# Patient Record
Sex: FEMALE | Race: WHITE | NOT HISPANIC OR LATINO | Employment: FULL TIME | URBAN - METROPOLITAN AREA
[De-identification: names, ages, dates, MRNs, and addresses within clinical notes are randomized per-mention and may not be internally consistent; named-entity substitution may affect disease eponyms.]

---

## 2022-02-27 ENCOUNTER — OFFICE VISIT (OUTPATIENT)
Dept: URGENT CARE | Facility: CLINIC | Age: 37
End: 2022-02-27
Payer: COMMERCIAL

## 2022-02-27 VITALS
HEART RATE: 102 BPM | WEIGHT: 145 LBS | OXYGEN SATURATION: 97 % | HEIGHT: 64 IN | TEMPERATURE: 100.4 F | DIASTOLIC BLOOD PRESSURE: 82 MMHG | SYSTOLIC BLOOD PRESSURE: 136 MMHG | RESPIRATION RATE: 18 BRPM | BODY MASS INDEX: 24.75 KG/M2

## 2022-02-27 DIAGNOSIS — J02.9 SORE THROAT: Primary | ICD-10-CM

## 2022-02-27 LAB — S PYO AG THROAT QL: NEGATIVE

## 2022-02-27 PROCEDURE — 99213 OFFICE O/P EST LOW 20 MIN: CPT

## 2022-02-27 PROCEDURE — 87880 STREP A ASSAY W/OPTIC: CPT

## 2022-02-27 PROCEDURE — 87070 CULTURE OTHR SPECIMN AEROBIC: CPT

## 2022-02-27 RX ORDER — LORATADINE 10 MG/1
10 TABLET ORAL DAILY
COMMUNITY

## 2022-02-27 RX ORDER — METHYLPREDNISOLONE 4 MG/1
TABLET ORAL
Qty: 21 EACH | Refills: 0 | Status: SHIPPED | OUTPATIENT
Start: 2022-02-27

## 2022-02-27 RX ORDER — MULTIVIT-MIN/IRON FUM/FOLIC AC 7.5 MG-4
1 TABLET ORAL DAILY
COMMUNITY

## 2022-02-27 NOTE — PATIENT INSTRUCTIONS
Strep Throat   Rapid strep negative in house  Will send for culture  OTC medications discussed  Steroid as directed  Follow up with PCP in 3-5 days  Proceed to ER if symptoms worsen  AMBULATORY CARE:   Strep throat  is a throat infection caused by bacteria  It is easily spread from person to person  Common symptoms include the following:   · Sore, red, and swollen throat    · Fever and headache     · Upset stomach, abdominal pain, or vomiting    · White or yellow patches or blisters in the back of your throat    · Tender, swollen lumps on the sides of your neck or jaw    · Throat pain when you swallow    Call 911 for any of the following:   · You have trouble breathing  Seek care immediately if:   · You have new symptoms like a bad headache, stiff neck, chest pain, or vomiting  · You are drooling because you cannot swallow your spit  Contact your healthcare provider if:   · You have a fever  · You have a rash or ear pain  · You have green, yellow-brown, or bloody mucus when you cough or blow your nose  · You are unable to drink anything  · You have questions or concerns about your condition or care  Treatment for strep throat  may include antibiotic medicine to treat your strep throat  You should feel better within 2 to 3 days after you start antibiotics  You may return to work or school 24 hours after you start antibiotics  Manage strep throat:   · Use lozenges, ice, soft foods, or popsicles  to soothe your throat  · Drink juice, milk shakes, or soup  if your throat is too sore to eat solid food  Drinking liquids can also help prevent dehydration  · Gargle with salt water  Mix ¼ teaspoon salt in a glass of warm water and gargle  This may help reduce swelling in your throat  · Do not smoke  Nicotine and other chemicals in cigarettes and cigars can cause lung damage and make your symptoms worse   Ask your healthcare provider for information if you currently smoke and need help to quit  E-cigarettes or smokeless tobacco still contain nicotine  Talk to your healthcare provider before you use these products  Prevent the spread of strep throat:   · Wash your hands often  Use soap and water  Wash your hands after you use the bathroom, change a child's diapers, or sneeze  Wash your hands before you prepare or eat food  · Do not share food or drinks  Replace your toothbrush after you have taken antibiotics for 24 hours  Follow up with your doctor as directed:  Write down your questions so you remember to ask them during your visits  © Copyright [employer] 2022 Information is for End User's use only and may not be sold, redistributed or otherwise used for commercial purposes  All illustrations and images included in CareNotes® are the copyrighted property of [employer]  or Jackie Frank  The above information is an  only  It is not intended as medical advice for individual conditions or treatments  Talk to your doctor, nurse or pharmacist before following any medical regimen to see if it is safe and effective for you

## 2022-02-27 NOTE — PROGRESS NOTES
[facility]        NAME: Rafaela De Leon is a 31 y o  female  : 1985    MRN: [id number]  DATE: 2022  TIME: 3:21 PM    Assessment and Plan   Sore throat [J02 9]  1  Sore throat  POCT rapid strepA    methylPREDNISolone 4 MG tablet therapy pack    Throat culture         Patient Instructions     Rapid strep negative  Throat culture sent  Consider mono if throat culture is negative  Steroid for inflammation and swelling  Follow up with PCP in 3-5 days  Proceed to ER if symptoms worsen  Chief Complaint     Chief Complaint   Patient presents with    Sore Throat     painful sore throat, redness; started wednesday         History of Present Illness     31 y o  F presents with complaint of sore throat and trouble swallowing x 5 days  Denies CP, SOB, N/V/D, fevers, chills, cough, congestion, rhinorrhea  Not taking anything OTC  Denies sick contacts  No tobacco use  No hx asthma  Not vaccinated  Review of Systems   Review of Systems   Constitutional: Negative for chills, fatigue and fever  HENT: Positive for sore throat and trouble swallowing  Negative for congestion, ear discharge, ear pain, facial swelling, rhinorrhea and sinus pain  Eyes: Negative for photophobia and visual disturbance  Respiratory: Negative for cough, chest tightness, shortness of breath and wheezing  Cardiovascular: Negative for chest pain, palpitations and leg swelling  Gastrointestinal: Negative for abdominal pain, diarrhea, nausea and vomiting  Genitourinary: Negative for dysuria and flank pain  Musculoskeletal: Negative for arthralgias, back pain and myalgias  Skin: Negative for pallor  Neurological: Negative for dizziness and headaches  Psychiatric/Behavioral: Negative for sleep disturbance           Current Medications       Current Outpatient Medications:     loratadine (CLARITIN) 10 mg tablet, Take 10 mg by mouth daily, Disp: , Rfl:     Multiple Vitamins-Minerals (multivitamin with minerals) tablet, Take 1 tablet by mouth daily, Disp: , Rfl:     methylPREDNISolone 4 MG tablet therapy pack, Use as directed on package, Disp: 21 each, Rfl: 0    Current Allergies     Allergies as of 2022    (No Known Allergies)            The following portions of the patient's history were reviewed and updated as appropriate: allergies, current medications, past family history, past medical history, past social history, past surgical history and problem list      Past Medical History:   Diagnosis Date    Allergic        Past Surgical History:   Procedure Laterality Date    BREAST SURGERY       SECTION      LIVER SURGERY         History reviewed  No pertinent family history  Medications have been verified  Objective   /82   Pulse 102   Temp 100 4 °F (38 °C)   Resp 18   Ht 5' 4" (1 626 m)   Wt 65 8 kg (145 lb)   SpO2 97%   BMI 24 89 kg/m²   No LMP recorded  Physical Exam     Physical Exam  Vitals reviewed  Constitutional:       General: She is not in acute distress  Appearance: Normal appearance  She is not toxic-appearing  HENT:      Head: Normocephalic  Right Ear: Tympanic membrane normal       Left Ear: Tympanic membrane normal       Nose: No congestion or rhinorrhea  Right Sinus: No maxillary sinus tenderness or frontal sinus tenderness  Left Sinus: No maxillary sinus tenderness or frontal sinus tenderness  Mouth/Throat:      Pharynx: Uvula midline  Posterior oropharyngeal erythema present  No oropharyngeal exudate or uvula swelling  Tonsils: Tonsillar exudate present  No tonsillar abscesses  2+ on the right  2+ on the left  Eyes:      Pupils: Pupils are equal, round, and reactive to light  Cardiovascular:      Rate and Rhythm: Normal rate and regular rhythm  Pulses: Normal pulses  Heart sounds: Normal heart sounds  Pulmonary:      Effort: Pulmonary effort is normal       Breath sounds: Normal breath sounds  No wheezing  Abdominal:      General: Bowel sounds are normal       Palpations: Abdomen is soft  Musculoskeletal:         General: Normal range of motion  Cervical back: Normal range of motion  Lymphadenopathy:      Cervical: No cervical adenopathy  Skin:     General: Skin is warm and dry  Neurological:      General: No focal deficit present  Mental Status: She is alert

## 2022-03-01 LAB — BACTERIA THROAT CULT: NORMAL

## 2023-01-25 ENCOUNTER — OFFICE VISIT (OUTPATIENT)
Dept: URGENT CARE | Facility: CLINIC | Age: 38
End: 2023-01-25

## 2023-01-25 VITALS
BODY MASS INDEX: 25.61 KG/M2 | HEART RATE: 103 BPM | DIASTOLIC BLOOD PRESSURE: 93 MMHG | RESPIRATION RATE: 18 BRPM | WEIGHT: 150 LBS | SYSTOLIC BLOOD PRESSURE: 132 MMHG | OXYGEN SATURATION: 99 % | TEMPERATURE: 98 F | HEIGHT: 64 IN

## 2023-01-25 DIAGNOSIS — J02.9 SORE THROAT: Primary | ICD-10-CM

## 2023-01-25 DIAGNOSIS — J02.0 STREP PHARYNGITIS: ICD-10-CM

## 2023-01-25 LAB — S PYO AG THROAT QL: NEGATIVE

## 2023-01-25 RX ORDER — AMOXICILLIN 500 MG/1
500 CAPSULE ORAL EVERY 12 HOURS SCHEDULED
Qty: 20 CAPSULE | Refills: 0 | Status: SHIPPED | OUTPATIENT
Start: 2023-01-25 | End: 2023-02-04

## 2023-01-25 NOTE — LETTER
January 25, 2023     Patient: Naveen Clemons   YOB: 1985   Date of Visit: 1/25/2023       To Whom it May Concern:    Carlos Cano was seen in my clinic on 1/25/2023  Please excuse from work on 1/26/2023 and 1/27/2023  If you have any questions or concerns, please don't hesitate to call           Sincerely,          TK Humphrey        CC: No Recipients

## 2023-01-26 NOTE — PATIENT INSTRUCTIONS
Take the antibiotics with food  Finish the entire course of antibiotics  Use a warm mist humidifier or vaporizer  Hot tea with honey  Warm saline gargle or throat lozenge may help with a sore throat  OTC saline nasal sprays   Drink plenty of fluids  Length To Time In Minutes Device Was In Place: 10

## 2023-01-26 NOTE — PROGRESS NOTES
[facility]        NAME: Rica Ding is a 30 y o  female  : 1985    MRN: [id number]  DATE: 2023  TIME: 7:50 PM    Assessment and Plan   Sore throat [J02 9]  1  Sore throat  POCT rapid strepA    Throat culture      2  Strep pharyngitis  amoxicillin (AMOXIL) 500 mg capsule            Patient Instructions       Follow up with PCP in 3-5 days  Proceed to  ER if symptoms worsen  Chief Complaint     Chief Complaint   Patient presents with   • Sore Throat     Pt reports swollen lymph nodes and sore throat for the past 2 days  History of Present Illness       Sore Throat   This is a new problem  The current episode started in the past 7 days  The problem has been gradually worsening  There has been no fever  The pain is mild  Associated symptoms include headaches, a hoarse voice and swollen glands  Pertinent negatives include no abdominal pain, congestion, coughing, diarrhea, shortness of breath, trouble swallowing or vomiting  She has had exposure to strep  Review of Systems   Review of Systems   Constitutional: Positive for fatigue  Negative for activity change, appetite change, chills and fever  HENT: Positive for hoarse voice and sore throat  Negative for congestion and trouble swallowing  Respiratory: Negative for cough and shortness of breath  Gastrointestinal: Negative for abdominal pain, diarrhea and vomiting  Musculoskeletal: Negative for arthralgias  Neurological: Positive for headaches  All other systems reviewed and are negative          Current Medications       Current Outpatient Medications:   •  amoxicillin (AMOXIL) 500 mg capsule, Take 1 capsule (500 mg total) by mouth every 12 (twelve) hours for 10 days, Disp: 20 capsule, Rfl: 0  •  loratadine (CLARITIN) 10 mg tablet, Take 10 mg by mouth daily, Disp: , Rfl:   •  Multiple Vitamins-Minerals (multivitamin with minerals) tablet, Take 1 tablet by mouth daily, Disp: , Rfl:   • methylPREDNISolone 4 MG tablet therapy pack, Use as directed on package (Patient not taking: Reported on 2023), Disp: 21 each, Rfl: 0    Current Allergies     Allergies as of 2023   • (No Known Allergies)            The following portions of the patient's history were reviewed and updated as appropriate: allergies, current medications, past family history, past medical history, past social history, past surgical history and problem list      Past Medical History:   Diagnosis Date   • Allergic    • Liver cyst        Past Surgical History:   Procedure Laterality Date   • BREAST SURGERY     •  SECTION         Family History   Problem Relation Age of Onset   • Cancer Mother    • Ulcerative colitis Mother    • No Known Problems Father          Medications have been verified  Objective   /93   Pulse 103   Temp 98 °F (36 7 °C)   Resp 18   Ht 5' 4" (1 626 m)   Wt 68 kg (150 lb)   LMP 2023   SpO2 99%   BMI 25 75 kg/m²        Physical Exam     Physical Exam  Vitals and nursing note reviewed  Constitutional:       General: She is not in acute distress  Appearance: She is well-developed and normal weight  She is not ill-appearing or toxic-appearing  HENT:      Right Ear: Tympanic membrane normal       Nose: No congestion  Mouth/Throat:      Pharynx: Oropharyngeal exudate and posterior oropharyngeal erythema present  Cardiovascular:      Rate and Rhythm: Normal rate and regular rhythm  Heart sounds: Normal heart sounds  Pulmonary:      Effort: Pulmonary effort is normal       Breath sounds: Normal breath sounds  Lymphadenopathy:      Cervical: Cervical adenopathy present  Skin:     General: Skin is warm  Capillary Refill: Capillary refill takes less than 2 seconds  Neurological:      General: No focal deficit present  Mental Status: She is alert and oriented to person, place, and time

## 2023-01-27 LAB — BACTERIA THROAT CULT: NORMAL

## 2023-04-29 ENCOUNTER — OFFICE VISIT (OUTPATIENT)
Dept: URGENT CARE | Facility: CLINIC | Age: 38
End: 2023-04-29

## 2023-04-29 VITALS
BODY MASS INDEX: 26.46 KG/M2 | OXYGEN SATURATION: 99 % | HEIGHT: 64 IN | DIASTOLIC BLOOD PRESSURE: 78 MMHG | WEIGHT: 155 LBS | RESPIRATION RATE: 16 BRPM | SYSTOLIC BLOOD PRESSURE: 120 MMHG | HEART RATE: 87 BPM | TEMPERATURE: 99.2 F

## 2023-04-29 DIAGNOSIS — H92.02 LEFT EAR PAIN: Primary | ICD-10-CM

## 2023-04-29 RX ORDER — CETIRIZINE HYDROCHLORIDE 10 MG/1
10 TABLET ORAL DAILY
COMMUNITY

## 2023-04-29 NOTE — PROGRESS NOTES
[facility]        NAME: Rosa Isela Mcarthur is a 30 y o  female  : 1985    MRN: [id number]  DATE: 2023  TIME: 4:11 PM    Assessment and Plan   Left ear pain [H92 02]  1  Left ear pain              Patient Instructions     Take tylenol/ibuprofen as needed for pain   Ice as needed   Flonase as needed  Follow up with PCP in 3-5 days  Proceed to  ER if symptoms worsen  Chief Complaint     Chief Complaint   Patient presents with   • Earache     C/o pain over  right ear, Points to upper auricle area  Onset of pain 4 days ago, but seemed worse this AM          History of Present Illness       Earache   There is pain in the right (top of auricle and behind right ear  Is a pressure and tinglng feeling) ear  This is a new problem  Episode onset: 4 days ago but worse this morning  There has been no fever  Pertinent negatives include no coughing, ear discharge, hearing loss, rhinorrhea or sore throat  Associated symptoms comments: 1 week ago had runny nose and congestion but has since resolved  She has tried nothing for the symptoms  Review of Systems   Review of Systems   Constitutional: Negative for chills, diaphoresis, fatigue and fever  HENT: Positive for ear pain (top of right auricle and behind right ear)  Negative for congestion, ear discharge, hearing loss, postnasal drip, rhinorrhea, sinus pressure, sore throat and trouble swallowing  Respiratory: Negative for cough, chest tightness, shortness of breath and wheezing  Cardiovascular: Negative for chest pain and palpitations  Skin: Negative for color change  Neurological: Negative for dizziness and light-headedness  Psychiatric/Behavioral: Negative for sleep disturbance           Current Medications       Current Outpatient Medications:   •  cetirizine (ZyrTEC) 10 mg tablet, Take 10 mg by mouth daily, Disp: , Rfl:   •  Multiple Vitamins-Minerals (multivitamin with minerals) tablet, Take 1 tablet by mouth daily, Disp: , "Rfl:   •  loratadine (CLARITIN) 10 mg tablet, Take 10 mg by mouth daily (Patient not taking: Reported on 2023), Disp: , Rfl:   •  methylPREDNISolone 4 MG tablet therapy pack, Use as directed on package (Patient not taking: Reported on 2023), Disp: 21 each, Rfl: 0    Current Allergies     Allergies as of 2023   • (No Known Allergies)            The following portions of the patient's history were reviewed and updated as appropriate: allergies, current medications, past family history, past medical history, past social history, past surgical history and problem list      Past Medical History:   Diagnosis Date   • Allergic    • Liver cyst        Past Surgical History:   Procedure Laterality Date   • BREAST SURGERY     •  SECTION         Family History   Problem Relation Age of Onset   • Cancer Mother    • Ulcerative colitis Mother    • No Known Problems Father          Medications have been verified  Objective   /78   Pulse 87   Temp 99 2 °F (37 3 °C)   Resp 16   Ht 5' 4\" (1 626 m)   Wt 70 3 kg (155 lb)   LMP 2023 Comment: Tubes removed  SpO2 99%   BMI 26 61 kg/m²        Physical Exam     Physical Exam  Constitutional:       General: She is not in acute distress  Appearance: Normal appearance  She is not ill-appearing  HENT:      Head: Normocephalic  Right Ear: Tympanic membrane and external ear normal       Left Ear: Tympanic membrane and external ear normal       Ears:      Comments: No pain to palpation at auricle, tragus, or mastoid  No redness, rash or swelling around ear  No drainage  Nose: No congestion  Mouth/Throat:      Mouth: Mucous membranes are moist       Pharynx: Oropharynx is clear  Eyes:      Extraocular Movements: Extraocular movements intact  Pupils: Pupils are equal, round, and reactive to light  Cardiovascular:      Rate and Rhythm: Normal rate and regular rhythm  Pulses: Normal pulses        Heart sounds: Normal " heart sounds  Pulmonary:      Effort: Pulmonary effort is normal  No respiratory distress  Breath sounds: Normal breath sounds  No stridor  No wheezing, rhonchi or rales  Lymphadenopathy:      Cervical: No cervical adenopathy  Skin:     General: Skin is warm and dry  Neurological:      General: No focal deficit present  Mental Status: She is alert and oriented to person, place, and time  Mental status is at baseline  Psychiatric:         Mood and Affect: Mood normal          Behavior: Behavior normal          Thought Content:  Thought content normal          Judgment: Judgment normal

## 2023-04-29 NOTE — PATIENT INSTRUCTIONS
Take tylenol/ibuprofen as needed for pain   Ice as needed   Flonase as needed  Follow up with PCP in 3-5 days  Proceed to  ER if symptoms worsen

## 2023-05-02 NOTE — PROGRESS NOTES
CLARIFY DIAGNOSIS RESPONSE NOTE    Based on the query that was sent to you, please clarify the diagnosis below  The final diagnosis is right ear pain, not left ear pain

## 2024-01-04 ENCOUNTER — OFFICE VISIT (OUTPATIENT)
Dept: URGENT CARE | Facility: CLINIC | Age: 39
End: 2024-01-04
Payer: COMMERCIAL

## 2024-01-04 ENCOUNTER — APPOINTMENT (OUTPATIENT)
Dept: RADIOLOGY | Facility: CLINIC | Age: 39
End: 2024-01-04
Payer: COMMERCIAL

## 2024-01-04 VITALS
DIASTOLIC BLOOD PRESSURE: 70 MMHG | HEART RATE: 82 BPM | SYSTOLIC BLOOD PRESSURE: 126 MMHG | HEIGHT: 64 IN | OXYGEN SATURATION: 99 % | WEIGHT: 150 LBS | TEMPERATURE: 97 F | RESPIRATION RATE: 17 BRPM | BODY MASS INDEX: 25.61 KG/M2

## 2024-01-04 DIAGNOSIS — S99.921A INJURY OF RIGHT FOOT, INITIAL ENCOUNTER: Primary | ICD-10-CM

## 2024-01-04 DIAGNOSIS — S09.90XA INJURY OF HEAD, INITIAL ENCOUNTER: ICD-10-CM

## 2024-01-04 PROCEDURE — 73630 X-RAY EXAM OF FOOT: CPT

## 2024-01-04 PROCEDURE — 99213 OFFICE O/P EST LOW 20 MIN: CPT

## 2024-01-04 NOTE — PROGRESS NOTES
[facility]        NAME: Melony Ramires is a 38 y.o. female  : 1985    MRN: [id number]  DATE: 2024  TIME: 6:13 PM    Assessment and Plan   Injury of right foot, initial encounter [S99.921A]  1. Injury of right foot, initial encounter  XR foot 3+ vw right    Ambulatory Referral to Orthopedic Surgery        Preliminary xray read by myself. Suspicious density at 5th metatarsal where she is having pain. Pending radiologist final read.      Boot applied and ortho referral given.     Patient Instructions     Tylenol or ibuprofen for inflammation and pain  Wear boot for support (Remove every 3 hours)  Ice 20 minutes 3-4 times per day  Insulate the skin from the ice to prevent frostbite  Rest and Elevate  Follow up with orthopedics    Remove splint/brace and go straight to ER if you experience sudden increase in pain, swelling, change in color/temperature/sensation, chest pain, shortness or breath, or if you start coughing up blood.   Follow up with PCP in 3-5 days.  Proceed to  ER if symptoms worsen.    Chief Complaint     Chief Complaint   Patient presents with    Foot Pain     C/o pain in right foot after using treadmill 5 days ago. Pain with weight bearing. Points to lateral right foot area radiating into plantar surface         History of Present Illness       Patient presenting with right foot pain x 5 days.  She has pain with weightbearing.  Her pain is at the lateral right foot right foot and radiates to the plantar surface. She denies any numbness or tingling of her foot. She has been icing it without relief.       Review of Systems   Review of Systems   Constitutional:  Negative for activity change, chills, diaphoresis, fatigue and fever.   Respiratory:  Negative for chest tightness, shortness of breath and wheezing.    Cardiovascular:  Negative for chest pain, palpitations and leg swelling.   Genitourinary: Negative.    Musculoskeletal:  Positive for arthralgias (R foot pain) and  "gait problem (due to pain). Negative for back pain, joint swelling, neck pain and neck stiffness.   Skin: Negative.    Neurological:  Negative for dizziness, tremors, syncope, weakness, light-headedness and numbness.         Current Medications       Current Outpatient Medications:     cetirizine (ZyrTEC) 10 mg tablet, Take 10 mg by mouth daily, Disp: , Rfl:     Multiple Vitamins-Minerals (multivitamin with minerals) tablet, Take 1 tablet by mouth daily, Disp: , Rfl:     loratadine (CLARITIN) 10 mg tablet, Take 10 mg by mouth daily (Patient not taking: Reported on 2023), Disp: , Rfl:     methylPREDNISolone 4 MG tablet therapy pack, Use as directed on package (Patient not taking: Reported on 2023), Disp: 21 each, Rfl: 0    Current Allergies     Allergies as of 2024    (No Known Allergies)            The following portions of the patient's history were reviewed and updated as appropriate: allergies, current medications, past family history, past medical history, past social history, past surgical history and problem list.     Past Medical History:   Diagnosis Date    Allergic     Liver cyst        Past Surgical History:   Procedure Laterality Date    BREAST SURGERY       SECTION      TUBAL LIGATION         Family History   Problem Relation Age of Onset    Cancer Mother     Ulcerative colitis Mother     No Known Problems Father          Medications have been verified.        Objective   /70   Pulse 82   Temp (!) 97 °F (36.1 °C)   Resp 17   Ht 5' 4\" (1.626 m)   Wt 68 kg (150 lb)   LMP 2023   SpO2 99%   BMI 25.75 kg/m²        Physical Exam     Physical Exam  Constitutional:       Appearance: Normal appearance.   HENT:      Head: Normocephalic.   Eyes:      Extraocular Movements: Extraocular movements intact.      Pupils: Pupils are equal, round, and reactive to light.   Cardiovascular:      Rate and Rhythm: Normal rate and regular rhythm.      Pulses: Normal pulses.      Heart " sounds: Normal heart sounds.   Pulmonary:      Effort: Pulmonary effort is normal.      Breath sounds: Normal breath sounds.   Musculoskeletal:         General: No swelling, tenderness, deformity or signs of injury. Normal range of motion.      Cervical back: Normal range of motion. No rigidity or tenderness.   Skin:     General: Skin is warm and dry.      Capillary Refill: Capillary refill takes less than 2 seconds.      Findings: No bruising, erythema or lesion.   Neurological:      General: No focal deficit present.      Mental Status: She is alert and oriented to person, place, and time. Mental status is at baseline.   Psychiatric:         Mood and Affect: Mood normal.         Behavior: Behavior normal.         Thought Content: Thought content normal.         Judgment: Judgment normal.   Orthopedic injury treatment    Date/Time: 1/4/2024 5:30 PM    Performed by: Pancho Kirkpatrick PA-C  Authorized by: Pancho Kirkpatrick PA-C    Patient Location:  Bedside  [place] Protocol:  Consent: Verbal consent obtained.  Risks and benefits: risks, benefits and alternatives were discussed  Consent given by: patient    Injury location:  Foot  Location details:  Right foot  Injury type:  Soft tissue  Neurovascular status: Neurovascularly intact    Distal perfusion: normal    Neurological function: normal    Range of motion: normal    Immobilization:  Other (comment) (walking boot)  Neurovascular status: Neurovascularly intact    Distal perfusion: normal    Neurological function: normal    Range of motion: normal    Patient tolerance:  Patient tolerated the procedure well with no immediate complications

## 2024-01-04 NOTE — PATIENT INSTRUCTIONS
Tylenol or ibuprofen for inflammation and pain  Wear boot for support (Remove every 3 hours)  Ice 20 minutes 3-4 times per day  Insulate the skin from the ice to prevent frostbite  Rest and Elevate  Follow up with orthopedics    Remove splint/brace and go straight to ER if you experience sudden increase in pain, swelling, change in color/temperature/sensation, chest pain, shortness or breath, or if you start coughing up blood.   Follow up with PCP in 3-5 days.  Proceed to  ER if symptoms worsen.

## 2024-01-12 ENCOUNTER — OFFICE VISIT (OUTPATIENT)
Dept: OBGYN CLINIC | Facility: CLINIC | Age: 39
End: 2024-01-12
Payer: COMMERCIAL

## 2024-01-12 VITALS
HEIGHT: 64 IN | HEART RATE: 74 BPM | BODY MASS INDEX: 26.22 KG/M2 | WEIGHT: 153.6 LBS | SYSTOLIC BLOOD PRESSURE: 120 MMHG | TEMPERATURE: 97.8 F | DIASTOLIC BLOOD PRESSURE: 70 MMHG

## 2024-01-12 DIAGNOSIS — M76.71 PERONEAL TENDINITIS, RIGHT: Primary | ICD-10-CM

## 2024-01-12 DIAGNOSIS — M79.671 ACUTE FOOT PAIN, RIGHT: ICD-10-CM

## 2024-01-12 PROCEDURE — 99203 OFFICE O/P NEW LOW 30 MIN: CPT | Performed by: STUDENT IN AN ORGANIZED HEALTH CARE EDUCATION/TRAINING PROGRAM

## 2024-01-12 NOTE — PROGRESS NOTES
"1. Peroneal tendinitis, right        2. Acute foot pain, right          No orders of the defined types were placed in this encounter.       Imaging Studies (I personally reviewed images in PACS and report):    X-ray right foot 1/4/2024: No acute osseous abnormalities.  No significant degenerative changes.  Soft tissues unremarkable.    IMPRESSION:  Acute atraumatic right lateral foot pain-aggravated while initially running in new footwear  Radiographic imaging unremarkable  Progressively improving since being in cam boot  Clinical history exam consistent with peroneal tendinitis    PLAN:    Clinical exam and radiographic imaging reviewed with patient today, with impression as per above. I have discussed with the patient the pathophysiology of this diagnosis and reviewed how the examination correlates with this diagnosis.    Prior imaging reviewed with patient today as noted above.  I reassured her that I did not appreciate nor did radiology note the indication of fracture at this time.  Clinically I do not feel she has a fracture as well.  I counseled her symptoms seem to be more consistent with peroneal tendinitis/myofascial pain possibly from transitioning into new footwear.  Counseled she can work on transitioning from her cam boot to regular footwear as tolerated at this time.   Provided her home exercise/stretches to help prevent recurrence of this pain in the future.  Regards to pain control I counseled as needed use of acetaminophen, NSAIDs, heat therapy 20 minutes on/off.    Return if symptoms worsen or fail to improve.      There are no Patient Instructions on file for this visit.    Portions of the record may have been created with voice recognition software. Occasional wrong word or \"sound a like\" substitutions may have occurred due to the inherent limitations of voice recognition software. Read the chart carefully and recognize, using context, where substitutions have occurred.     CHIEF " COMPLAINT:  Chief Complaint   Patient presents with    Right Foot - Pain         HPI:  Melony Ramires is a 38 y.o. female  who presents for       Visit 2024:  Initial evaluation of right foot pain:  Patient had gone to urgent care for this issue on 2024 and had imaging done as noted above.  She was placed in a cam boot and is here today to follow-up from urgent care.    Patient reports aggravating her right lateral foot approximately 2 weeks ago.  She cannot recall any precipitating injury.  She states she did recently buy new footwear to start running.  She noticed within the first mile of running she was having progressively worsening lateral foot pain that she describes as sharp and aching.  She reports she did not persistently run through the pain and attempted to rest but noticed with prolonged walking he also had an aggravating pain.  She states despite the pain, foot did not swell she denies any bruising/discoloration, N/T.  She reports use of Tylenol and NSAIDs but did not feel it provided much relief which prompted her to go to urgent care.  She states while using the cam boot she did notice significant improvement of her pain in the past few days and that she only has minimal/intermittent discomfort over the lateral foot.  She denies having similar pain in the past.  Denies prior surgeries of her right foot in the past.        Review of Systems      Medical, Surgical, Family, and Social History    Past Medical History:   Diagnosis Date    Allergic     Liver cyst      Past Surgical History:   Procedure Laterality Date    BREAST SURGERY       SECTION      TUBAL LIGATION       Social History   Social History     Substance and Sexual Activity   Alcohol Use Yes    Comment: social     Social History     Substance and Sexual Activity   Drug Use Not Currently     Social History     Tobacco Use   Smoking Status Never   Smokeless Tobacco Never     Family History   Problem Relation Age of Onset  "   Cancer Mother     Ulcerative colitis Mother     No Known Problems Father      No Known Allergies       Physical Exam  /70   Pulse 74   Temp 97.8 °F (36.6 °C) (Temporal)   Ht 5' 4\" (1.626 m)   Wt 69.7 kg (153 lb 9.6 oz)   LMP 12/17/2023   BMI 26.37 kg/m²     Constitutional:  see vital signs  Gen: well-developed, normocephalic/atraumatic, well-groomed  Eyes: No inflammation or discharge of conjunctiva or lids; sclera clear   Pulmonary/Chest: Effort normal. No respiratory distress.     Ortho Exam   Ankle Examination (focused):     Gait: Patient seen weightbearing in a high tide Cam boot of her right foot.  Cam boot was removed for examination.  She is able to demonstrate weightbearing outside of the cam boot as well as toe standing and walking without any reported pain      RIGHT   Inspection Erythema none    Edema none    Ecchymosis none        ROM:  Plantarflexion 50    Dorsiflexion 20        Strength Pronation 5/5    Supination 5/5    Foot plantarflexion 5/5    Foot dorsflexion 5/5        TTP AiTFL no    ATFL no    CFL no    PTFL no    Achilles no    Deltoid no    Peroneal +minimal discomfort    Tib Ant no    Tib Post no        TTP (Bony) Prox Fibula no    Lat Malleolus no    Base of 5th MT No (and no pain with application of tuning fork testing)    Med Malleolus no    Navicular no    Talar Dome no        Anterior Drawer ATFL negative   Calcaneal Squeeze  negative   Tib-Fib Squeeze Test  negative   Talar Tilt (stab tib,DF foot,invert foot) CFL negative   ER Stress (stab tib,ER foot) High ankle negative   Eversion stress (stab tib, soo foot) deltoid negative   MT Compression  negative         No calf tenderness to palpation     LE NV Exam: +2 DP/PT pulses   Sensation intact to light touch          Procedures        "

## 2024-05-23 ENCOUNTER — OFFICE VISIT (OUTPATIENT)
Dept: URGENT CARE | Facility: CLINIC | Age: 39
End: 2024-05-23
Payer: COMMERCIAL

## 2024-05-23 VITALS
TEMPERATURE: 98.2 F | OXYGEN SATURATION: 99 % | SYSTOLIC BLOOD PRESSURE: 112 MMHG | HEART RATE: 77 BPM | WEIGHT: 150 LBS | RESPIRATION RATE: 16 BRPM | HEIGHT: 64 IN | BODY MASS INDEX: 25.61 KG/M2 | DIASTOLIC BLOOD PRESSURE: 76 MMHG

## 2024-05-23 DIAGNOSIS — R10.13 EPIGASTRIC PAIN: Primary | ICD-10-CM

## 2024-05-23 PROCEDURE — 99213 OFFICE O/P EST LOW 20 MIN: CPT

## 2024-05-23 NOTE — PROGRESS NOTES
[facility]        NAME: Melony Ramires is a 38 y.o. female  : 1985    MRN: [id number]  DATE: May 23, 2024  TIME: 5:47 PM    Assessment and Plan   Epigastric pain [R10.13]  1. Epigastric pain          Unclear etiology for patient's symptoms. Encouraged continued supportive measures.  Follow up with PCP in 3-5 days or proceed to emergency department for worsening symptoms.  Low threshold to proceed to ED as may need imaging or laboratory studies to determine etiology. Red flag symptoms reviewed. Patient verbalized understanding of instructions given.       Patient Instructions     Patient Instructions   Increase fluid intake  OTC TUMS or Pepto-Bismol   Monitor symptoms   Follow up with PCP in 3-5 days.  Proceed to ER if symptoms worsen.    If tests have been performed at [facility], our office will contact you with results if changes need to be made to the care plan discussed with you at the visit.  You can review your full results on [facility] MyChart.    Abdominal Pain   AMBULATORY CARE:   Abdominal pain  may be felt anywhere between the bottom of your rib cage and your groin. Acute pain usually lasts less than 3 months. Chronic pain lasts longer than 3 months. Your pain may be sharp or dull. The pain may stay in the same place or move around. You may have the pain all the time, or it may come and go. Depending on the cause, you may also have nausea, vomiting, fever, or diarrhea.       Call your local emergency number (911 in the [place]) if:   You have chest pain or shortness of breath.      Seek care immediately if:   You have pulsing pain in your upper abdomen or lower back that suddenly becomes constant.    Your pain is in the right lower abdominal area and worsens with movement.    You have a fever over 100.4°F (38°C) or shaking chills.    You are vomiting and cannot keep food or liquids down.    Your pain does not improve or gets worse over the next 8 to 12 hours.    You see blood in your  vomit or bowel movements, or they look black and tarry.    Your skin or the whites of your eyes turn yellow.    You are a woman and have a large amount of vaginal bleeding that is not your monthly period.    Call your doctor if:   You have pain in your lower back.    You are a man and have pain in your testicles.    You have pain when you urinate.    You have questions or concerns about your condition or care.    The cause of your abdominal pain  may not be found. The following are common causes:  Overeating, gas pains, or food poisoning    Constipation or diarrhea    An injury    Appendicitis, a hernia, or an ulcer    Infection or a blockage    A liver, gallbladder, or kidney condition    Treatment for abdominal pain  may include any of the following:  Medicines  may be given to calm your stomach or prevent vomiting.    Prescription pain medicine  may be given. Ask your healthcare provider how to take this medicine safely. Some prescription pain medicines contain acetaminophen. Do not take other medicines that contain acetaminophen without talking to your healthcare provider. Too much acetaminophen may cause liver damage. Prescription pain medicine may cause constipation. Ask your healthcare provider how to prevent or treat constipation.     Relaxation therapy  may be used along with pain medicine.    Surgery  may be needed, depending on the cause.    Manage or prevent abdominal pain:   Apply heat  on your abdomen for 20 to 30 minutes every 2 hours for as many days as directed. Heat helps decrease pain and muscle spasms.    Make changes to the foods you eat, if needed.  Do not eat foods that cause abdominal pain or other symptoms. Eat small meals more often. The following changes may also help:    Eat more high-fiber foods if you are constipated.  High-fiber foods include fruits, vegetables, whole-grain foods, and legumes such as vera beans.         Do not eat foods that cause gas if you have bloating.  Examples  include broccoli, cabbage, beans, and carbonated drinks.    Do not eat foods or drinks that contain sorbitol or fructose if you have diarrhea and bloating.  Some examples are fruit juices, candy, jelly, and sugar-free gum.    Do not eat high-fat foods.  Examples include fried foods, cheeseburgers, hot dogs, and desserts.    Make changes to the liquids you drink, if needed.  Do not drink liquids that cause pain or make it worse, such as orange juice. Drink liquids throughout the day to stay hydrated. The following changes may also help:    Drink more liquids to prevent dehydration from diarrhea or vomiting.  Ask your healthcare provider how much liquid to drink each day and which liquids are best for you.    Limit or do not have caffeine.  Caffeine may make symptoms such as heartburn or nausea worse.    Limit or do not drink alcohol.  Alcohol can make your abdominal pain worse. Ask your healthcare provider if it is okay for you to drink alcohol. Also ask how much is okay for you to drink. A drink of alcohol is 12 ounces of beer, ½ ounce of liquor, or 5 ounces of wine.    Keep a diary of your abdominal pain.  A diary may help your healthcare provider learn what is causing your pain. Include when the pain happens, how long it lasts, and what the pain feels like. Write down any other symptoms you have with abdominal pain. Also write down what you eat, and what symptoms you have after you eat.    Manage stress.  Stress may cause abdominal pain. Your healthcare provider may recommend relaxation techniques and deep breathing exercises to help decrease your stress. Your healthcare provider may recommend you talk to someone about your stress or anxiety, such as a counselor or a friend. Get plenty of sleep. Exercise regularly.         Do not smoke.  Nicotine and other chemicals in cigarettes can damage your esophagus and stomach. Ask your healthcare provider for information if you currently smoke and need help to quit.  E-cigarettes or smokeless tobacco still contain nicotine. Talk to your healthcare provider before you use these products.    Follow up with your doctor as directed:  Write down your questions so you remember to ask them during your visits.  © Copyright [employer] 2023 Information is for End User's use only and may not be sold, redistributed or otherwise used for commercial purposes.  The above information is an  only. It is not intended as medical advice for individual conditions or treatments. Talk to your doctor, nurse or pharmacist before following any medical regimen to see if it is safe and effective for you.        Chief Complaint     Chief Complaint   Patient presents with    Abdominal Pain     Upper abdominal pain x2 days after eating lunch, comes and goes since then; upset stomach and diarrhea today. Pain is described as a pressure-like pain.   Tums and Pepto with some relief          History of Present Illness       38-year-old female with a past medical history significant for IBS presents with complaints of epigastric pain x 3 days.  Patient reports symptoms started Tuesday after eating lunch, which included a salad, pineapples, and cookies.  Reports pain most severe on Tuesday however has been waxing and waning since.  1 episode of diarrhea today.  Denies fever, chills, nausea, vomiting, or blood in stool.  Able to eat and drink without much difficulty as tolerating p.o. intake.  She has been taking OTC Tums and Pepto-Bismol with some relief.  Reports called PCP for appointment and advised to seek care at urgent care facility.        Review of Systems   Review of Systems   Constitutional:  Negative for chills and fever.   HENT:  Negative for congestion, ear discharge, ear pain, rhinorrhea and sore throat.    Respiratory:  Negative for cough and shortness of breath.    Cardiovascular:  Negative for chest pain.   Gastrointestinal:  Positive for abdominal pain and diarrhea. Negative for blood  "in stool, nausea and vomiting.   Genitourinary:  Negative for decreased urine volume and difficulty urinating.   Skin:  Negative for rash.         Current Medications       Current Outpatient Medications:     cetirizine (ZyrTEC) 10 mg tablet, Take 10 mg by mouth daily, Disp: , Rfl:     Multiple Vitamins-Minerals (multivitamin with minerals) tablet, Take 1 tablet by mouth daily, Disp: , Rfl:     loratadine (CLARITIN) 10 mg tablet, Take 10 mg by mouth daily (Patient not taking: Reported on 2024), Disp: , Rfl:     methylPREDNISolone 4 MG tablet therapy pack, Use as directed on package (Patient not taking: Reported on 2024), Disp: 21 each, Rfl: 0    Current Allergies     Allergies as of 2024    (No Known Allergies)            The following portions of the patient's history were reviewed and updated as appropriate: allergies, current medications, past family history, past medical history, past social history, past surgical history and problem list.     Past Medical History:   Diagnosis Date    Allergic     Liver cyst        Past Surgical History:   Procedure Laterality Date    BREAST SURGERY       SECTION      TUBAL LIGATION         Family History   Problem Relation Age of Onset    Cancer Mother     Ulcerative colitis Mother     No Known Problems Father          Medications have been verified.        Objective   /76   Pulse 77   Temp 98.2 °F (36.8 °C)   Resp 16   Ht 5' 4\" (1.626 m)   Wt 68 kg (150 lb)   SpO2 99%   BMI 25.75 kg/m²   No LMP recorded.       Physical Exam     Physical Exam  Vitals and nursing note reviewed.   Constitutional:       General: She is not in acute distress.     Appearance: She is not toxic-appearing.   HENT:      Head: Normocephalic.   Eyes:      Conjunctiva/sclera: Conjunctivae normal.   Cardiovascular:      Rate and Rhythm: Normal rate and regular rhythm.      Heart sounds: Normal heart sounds.   Pulmonary:      Effort: Pulmonary effort is normal. No " respiratory distress.      Breath sounds: Normal breath sounds. No stridor. No wheezing, rhonchi or rales.   Abdominal:      General: Bowel sounds are normal.      Palpations: Abdomen is soft.      Tenderness: There is no abdominal tenderness.   Skin:     General: Skin is warm and dry.   Neurological:      Mental Status: She is alert and oriented to person, place, and time.      Gait: Gait is intact.   Psychiatric:         Mood and Affect: Mood normal.         Behavior: Behavior normal.

## 2024-05-23 NOTE — PATIENT INSTRUCTIONS
Increase fluid intake  OTC TUMS or Pepto-Bismol   Monitor symptoms   Follow up with PCP in 3-5 days.  Proceed to ER if symptoms worsen.    If tests have been performed at [facility], our office will contact you with results if changes need to be made to the care plan discussed with you at the visit.  You can review your full results on [facility] MyChart.    Abdominal Pain   AMBULATORY CARE:   Abdominal pain  may be felt anywhere between the bottom of your rib cage and your groin. Acute pain usually lasts less than 3 months. Chronic pain lasts longer than 3 months. Your pain may be sharp or dull. The pain may stay in the same place or move around. You may have the pain all the time, or it may come and go. Depending on the cause, you may also have nausea, vomiting, fever, or diarrhea.       Call your local emergency number (911 in the [place]) if:   You have chest pain or shortness of breath.      Seek care immediately if:   You have pulsing pain in your upper abdomen or lower back that suddenly becomes constant.    Your pain is in the right lower abdominal area and worsens with movement.    You have a fever over 100.4°F (38°C) or shaking chills.    You are vomiting and cannot keep food or liquids down.    Your pain does not improve or gets worse over the next 8 to 12 hours.    You see blood in your vomit or bowel movements, or they look black and tarry.    Your skin or the whites of your eyes turn yellow.    You are a woman and have a large amount of vaginal bleeding that is not your monthly period.    Call your doctor if:   You have pain in your lower back.    You are a man and have pain in your testicles.    You have pain when you urinate.    You have questions or concerns about your condition or care.    The cause of your abdominal pain  may not be found. The following are common causes:  Overeating, gas pains, or food poisoning    Constipation or diarrhea    An injury    Appendicitis, a hernia, or an ulcer    Infection  or a blockage    A liver, gallbladder, or kidney condition    Treatment for abdominal pain  may include any of the following:  Medicines  may be given to calm your stomach or prevent vomiting.    Prescription pain medicine  may be given. Ask your healthcare provider how to take this medicine safely. Some prescription pain medicines contain acetaminophen. Do not take other medicines that contain acetaminophen without talking to your healthcare provider. Too much acetaminophen may cause liver damage. Prescription pain medicine may cause constipation. Ask your healthcare provider how to prevent or treat constipation.     Relaxation therapy  may be used along with pain medicine.    Surgery  may be needed, depending on the cause.    Manage or prevent abdominal pain:   Apply heat  on your abdomen for 20 to 30 minutes every 2 hours for as many days as directed. Heat helps decrease pain and muscle spasms.    Make changes to the foods you eat, if needed.  Do not eat foods that cause abdominal pain or other symptoms. Eat small meals more often. The following changes may also help:    Eat more high-fiber foods if you are constipated.  High-fiber foods include fruits, vegetables, whole-grain foods, and legumes such as vera beans.         Do not eat foods that cause gas if you have bloating.  Examples include broccoli, cabbage, beans, and carbonated drinks.    Do not eat foods or drinks that contain sorbitol or fructose if you have diarrhea and bloating.  Some examples are fruit juices, candy, jelly, and sugar-free gum.    Do not eat high-fat foods.  Examples include fried foods, cheeseburgers, hot dogs, and desserts.    Make changes to the liquids you drink, if needed.  Do not drink liquids that cause pain or make it worse, such as orange juice. Drink liquids throughout the day to stay hydrated. The following changes may also help:    Drink more liquids to prevent dehydration from diarrhea or vomiting.  Ask your healthcare  provider how much liquid to drink each day and which liquids are best for you.    Limit or do not have caffeine.  Caffeine may make symptoms such as heartburn or nausea worse.    Limit or do not drink alcohol.  Alcohol can make your abdominal pain worse. Ask your healthcare provider if it is okay for you to drink alcohol. Also ask how much is okay for you to drink. A drink of alcohol is 12 ounces of beer, ½ ounce of liquor, or 5 ounces of wine.    Keep a diary of your abdominal pain.  A diary may help your healthcare provider learn what is causing your pain. Include when the pain happens, how long it lasts, and what the pain feels like. Write down any other symptoms you have with abdominal pain. Also write down what you eat, and what symptoms you have after you eat.    Manage stress.  Stress may cause abdominal pain. Your healthcare provider may recommend relaxation techniques and deep breathing exercises to help decrease your stress. Your healthcare provider may recommend you talk to someone about your stress or anxiety, such as a counselor or a friend. Get plenty of sleep. Exercise regularly.         Do not smoke.  Nicotine and other chemicals in cigarettes can damage your esophagus and stomach. Ask your healthcare provider for information if you currently smoke and need help to quit. E-cigarettes or smokeless tobacco still contain nicotine. Talk to your healthcare provider before you use these products.    Follow up with your doctor as directed:  Write down your questions so you remember to ask them during your visits.  © Copyright [employer] 2023 Information is for End User's use only and may not be sold, redistributed or otherwise used for commercial purposes.  The above information is an  only. It is not intended as medical advice for individual conditions or treatments. Talk to your doctor, nurse or pharmacist before following any medical regimen to see if it is safe and effective for you.

## 2024-06-09 ENCOUNTER — OFFICE VISIT (OUTPATIENT)
Dept: URGENT CARE | Facility: CLINIC | Age: 39
End: 2024-06-09
Payer: COMMERCIAL

## 2024-06-09 VITALS
HEART RATE: 73 BPM | OXYGEN SATURATION: 100 % | RESPIRATION RATE: 18 BRPM | HEIGHT: 64 IN | TEMPERATURE: 97.5 F | SYSTOLIC BLOOD PRESSURE: 138 MMHG | DIASTOLIC BLOOD PRESSURE: 82 MMHG | BODY MASS INDEX: 25.74 KG/M2 | WEIGHT: 150.8 LBS

## 2024-06-09 DIAGNOSIS — L23.7 POISON IVY: Primary | ICD-10-CM

## 2024-06-09 PROCEDURE — S9083 URGENT CARE CENTER GLOBAL: HCPCS | Performed by: PREVENTIVE MEDICINE

## 2024-06-09 PROCEDURE — G0382 LEV 3 HOSP TYPE B ED VISIT: HCPCS | Performed by: PREVENTIVE MEDICINE

## 2024-06-09 RX ORDER — PANTOPRAZOLE SODIUM 20 MG/1
TABLET, DELAYED RELEASE ORAL
COMMUNITY
Start: 2024-05-24

## 2024-06-09 RX ORDER — METHYLPREDNISOLONE 4 MG/1
TABLET ORAL
Qty: 1 EACH | Refills: 0 | Status: SHIPPED | OUTPATIENT
Start: 2024-06-09 | End: 2024-06-20

## 2024-06-09 NOTE — PROGRESS NOTES
[facility]        NAME: Melony Ramires is a 38 y.o. female  : 1985    MRN: [id number]  DATE: 2024  TIME: 1:31 PM    Assessment and Plan   Poison ivy [L23.7]  1. Poison ivy              Patient Instructions       Follow up with PCP in 3-5 days.  Proceed to  ER if symptoms worsen.    If tests have been performed at [facility], our office will contact you with results if changes need to be made to the care plan discussed with you at the visit.  You can review your full results on [facility].    Chief Complaint     Chief Complaint   Patient presents with    Rash     Poison ivy since Wed that has been spreading         History of Present Illness       Itchy erythematous rash with exposure to poison ivy    Rash        Review of Systems   Review of Systems   Skin:  Positive for rash.         Current Medications       Current Outpatient Medications:     cetirizine (ZyrTEC) 10 mg tablet, Take 10 mg by mouth daily, Disp: , Rfl:     Multiple Vitamins-Minerals (multivitamin with minerals) tablet, Take 1 tablet by mouth daily, Disp: , Rfl:     pantoprazole (PROTONIX) 20 mg tablet, PLEASE SEE ATTACHED FOR DETAILED DIRECTIONS, Disp: , Rfl:     loratadine (CLARITIN) 10 mg tablet, Take 10 mg by mouth daily (Patient not taking: Reported on 2024), Disp: , Rfl:     methylPREDNISolone 4 MG tablet therapy pack, Use as directed on package (Patient not taking: Reported on 2024), Disp: 21 each, Rfl: 0    Current Allergies     Allergies as of 2024    (No Known Allergies)            The following portions of the patient's history were reviewed and updated as appropriate: allergies, current medications, past family history, past medical history, past social history, past surgical history and problem list.     Past Medical History:   Diagnosis Date    Allergic     Liver cyst        Past Surgical History:   Procedure Laterality Date    BREAST SURGERY       SECTION      TUBAL LIGATION   "       Family History   Problem Relation Age of Onset    Cancer Mother     Ulcerative colitis Mother     No Known Problems Father          Medications have been verified.        Objective   /82   Pulse 73   Temp 97.5 °F (36.4 °C) (Tympanic)   Resp 18   Ht 5' 4\" (1.626 m)   Wt 68.4 kg (150 lb 12.8 oz)   SpO2 100%   BMI 25.88 kg/m²   No LMP recorded.       Physical Exam     Physical Exam  Skin:     Comments: Erythematous macular papular rash with some crusting on trunk and somewhat on extremities                   "

## 2024-06-20 ENCOUNTER — OFFICE VISIT (OUTPATIENT)
Dept: URGENT CARE | Facility: CLINIC | Age: 39
End: 2024-06-20
Payer: COMMERCIAL

## 2024-06-20 VITALS
HEIGHT: 64 IN | TEMPERATURE: 97.8 F | RESPIRATION RATE: 18 BRPM | HEART RATE: 65 BPM | SYSTOLIC BLOOD PRESSURE: 139 MMHG | OXYGEN SATURATION: 99 % | BODY MASS INDEX: 25.61 KG/M2 | DIASTOLIC BLOOD PRESSURE: 92 MMHG | WEIGHT: 150 LBS

## 2024-06-20 DIAGNOSIS — L23.7 ALLERGIC CONTACT DERMATITIS DUE TO PLANTS, EXCEPT FOOD: Primary | ICD-10-CM

## 2024-06-20 PROCEDURE — G0382 LEV 3 HOSP TYPE B ED VISIT: HCPCS

## 2024-06-20 PROCEDURE — S9083 URGENT CARE CENTER GLOBAL: HCPCS

## 2024-06-20 RX ORDER — PREDNISONE 20 MG/1
60 TABLET ORAL DAILY
Qty: 15 TABLET | Refills: 0 | Status: SHIPPED | OUTPATIENT
Start: 2024-06-20 | End: 2024-06-25

## 2024-06-20 RX ORDER — MOMETASONE FUROATE 1 MG/G
CREAM TOPICAL DAILY
Qty: 15 G | Refills: 0 | Status: SHIPPED | OUTPATIENT
Start: 2024-06-20

## 2024-06-20 NOTE — PATIENT INSTRUCTIONS
Take prednisone as prescribed     Scrub areas exposed to poison plants with washcloth and Thu soap after initial contact  Apply Elocon cream as prescribed (Do not use for longer than 4 weeks)    Avoid scratching area  Topical benadryl cream or calamine lotion during the day  Cool compresses  Allegra and Pepcid over the counter  Oral benadryl for itching as needed at night  Keep area clean and dry  Watch for signs of infection    Follow up with PCP in 3-5 days.  Proceed to  ER if symptoms worsen.    If tests are performed, our office will contact you with results only if changes need to made to the care plan discussed with you at the visit. You can review your full results on [facility] My[facility].    Poison Ivy   WHAT YOU NEED TO KNOW:   Poison ivy is a plant that can cause an itchy, uncomfortable rash on your skin. Poison ivy grows as a shrub or vine in woods, fields, and areas of thick underbrush. It has 3 bright green leaves on each stem that turn red in ac.   DISCHARGE INSTRUCTIONS:   Medicines:   Antiseptic or drying creams or ointments:  These medicines may be used to dry out the rash and decrease the itching. These products may be available without a doctor's order.     Steroids:  This medicine helps decrease itching and inflammation. It can be given as a cream to apply to your skin or as a pill.     Antihistamines:  This medicine may help decrease itching and help you sleep. It is available without a doctor's order.     Take your medicine as directed.  Contact your healthcare provider if you think your medicine is not helping or if you have side effects. Tell your provider if you are allergic to any medicine. Keep a list of the medicines, vitamins, and herbs you take. Include the amounts, and when and why you take them. Bring the list or the pill bottles to follow-up visits. Carry your medicine list with you in case of an emergency.    Follow up with your doctor as directed:  Write down your questions so  you remember to ask them during your visits.  How your poison ivy rash spreads:  You cannot spread poison ivy by touching your rash or the liquid from your blisters. Poison ivy is spread only if you scratch your skin while it still has oil on it. You may think your rash is spreading because new rashes appear over a number of days. This happens because areas covered by thin skin break out in a rash first. Your face or forearms may develop a rash before thicker areas, such as the palms of your hands.   Self-care:   Keep your rash clean and dry:  Wash it with soap and water. Gently pat it dry with a clean towel.    Try not to scratch or rub your rash:  This can cause your skin to become infected.    Use a compress on your rash:  Dip a clean washcloth in cool water. Wring it out and place it on your rash. Leave the washcloth on your skin for 15 minutes. Do this at least 3 times per day.     Take a cornstarch or oatmeal bath:  If your rash is too large to cover with wet washcloths, take 3 or 4 cornstarch baths daily. Mix 1 pound of cornstarch with a little water to make a paste. Add the paste to a tub full of water and mix well. You may also use colloidal oatmeal in the bath water. Use lukewarm water. Avoid hot water because it may cause your itching to increase.    Prevent a poison ivy rash in the future:   Wear skin protection:  Wear long pants, a long-sleeved shirt, and gloves. Use a skin block lotion to protect your skin from poison ivy oil. You can find this at a drugstore without a prescription.    Wash clothing after possible exposure:  If you think you have been near a poison ivy plant, wash the clothes you were wearing separately from other clothes. Rinse the washing machine well after you take the clothes out. Scrub boots and shoes with warm, soapy water. Dry clean items and clothing that you cannot wash in water. Poison ivy oil is sticky and can stay on surfaces for a long time. It can cause a new rash even  years later.     Bathe your pet:  Use warm water and shampoo on your pet's fur. This will prevent the spread of oil to your skin, car, and home. Wear long sleeves, long pants, and gloves while washing pets or any items that may have oil on them.    Reduce exposure to poison ivy:  Do not touch plants that look like poison ivy. Keep your yard free of poison ivy. While protecting your skin, remove the plant and the roots. Place them in a plastic bag and seal the bag tightly.     Do not burn poison ivy plants:  This can spread the oil through the air. If you breathe the oil into your lungs, you could have swelling and serious breathing problems. Oil that clings to the fire citlalli can land on your skin and cause a rash.    Contact your healthcare provider if:   You have pus, soft yellow scabs, or tenderness on the rash.    The itching gets worse or keeps you awake at night.    The rash covers more than 1/4 of your skin or spreads to your eyes, mouth, or genital area.     The rash is not better after 2 to 3 weeks.    You have tender, swollen glands on the sides of your neck.    You have swelling in your arms and legs.    You have questions or concerns about your condition or care.    Return to the emergency department if:   You have a fever.    You have redness, swelling, and tenderness around the rash.    You have trouble breathing.    © Copyright [employer] 2023 Information is for End User's use only and may not be sold, redistributed or otherwise used for commercial purposes.  The above information is an  only. It is not intended as medical advice for individual conditions or treatments. Talk to your doctor, nurse or pharmacist before following any medical regimen to see if it is safe and effective for you.

## 2024-06-20 NOTE — PROGRESS NOTES
[facility]        NAME: Melony Ramires is a 38 y.o. female  : 1985    MRN: [id number]  DATE: 2024  TIME: 6:52 PM    Assessment and Plan   Allergic contact dermatitis due to plants, except food [L23.7]  1. Allergic contact dermatitis due to plants, except food  predniSONE 20 mg tablet    mometasone (ELOCON) 0.1 % cream            Patient Instructions     Take prednisone as prescribed     Scrub areas exposed to poison plants with washcloth and Thu soap after initial contact  Apply Elocon cream as prescribed (Do not use for longer than 4 weeks)    Avoid scratching area  Topical benadryl cream or calamine lotion during the day  Cool compresses  Allegra and Pepcid over the counter  Oral benadryl for itching as needed at night  Keep area clean and dry  Watch for signs of infection    Follow up with PCP in 3-5 days.  Proceed to  ER if symptoms worsen.    If tests are performed, our office will contact you with results only if changes need to made to the care plan discussed with you at the visit. You can review your full results on [facility].    Chief Complaint     Chief Complaint   Patient presents with    Rash     Poison ivy. Completed steroid cycle.          History of Present Illness       38-year-old female arrives reporting she was treated for poison ivy rash last week and felt like it had went away, and then reappeared yesterday.  Patient reports multiple spots to bilateral upper extremities and right side of neck that are itchy.  Patient reports she completed full course of steroid pack.  Patient denies any fevers or any other recent illnesses.  Patient denies any exposure to poison ivy.  Patient reports severe itchiness at present.  Patient denies any chest pain or shortness of breath.    Rash  Pertinent negatives include no fever.       Review of Systems   Review of Systems   Constitutional:  Negative for chills and fever.   Skin:  Positive for rash. Negative for color  "change.   All other systems reviewed and are negative.        Current Medications       Current Outpatient Medications:     cetirizine (ZyrTEC) 10 mg tablet, Take 10 mg by mouth daily, Disp: , Rfl:     mometasone (ELOCON) 0.1 % cream, Apply topically daily, Disp: 15 g, Rfl: 0    Multiple Vitamins-Minerals (multivitamin with minerals) tablet, Take 1 tablet by mouth daily, Disp: , Rfl:     pantoprazole (PROTONIX) 20 mg tablet, PLEASE SEE ATTACHED FOR DETAILED DIRECTIONS, Disp: , Rfl:     predniSONE 20 mg tablet, Take 3 tablets (60 mg total) by mouth daily for 5 days, Disp: 15 tablet, Rfl: 0    loratadine (CLARITIN) 10 mg tablet, Take 10 mg by mouth daily (Patient not taking: Reported on 2024), Disp: , Rfl:     Current Allergies     Allergies as of 2024    (No Known Allergies)            The following portions of the patient's history were reviewed and updated as appropriate: allergies, current medications, past family history, past medical history, past social history, past surgical history and problem list.     Past Medical History:   Diagnosis Date    Allergic     Liver cyst        Past Surgical History:   Procedure Laterality Date    BREAST SURGERY       SECTION      TUBAL LIGATION         Family History   Problem Relation Age of Onset    Cancer Mother     Ulcerative colitis Mother     No Known Problems Father          Medications have been verified.        Objective   /92   Pulse 65   Temp 97.8 °F (36.6 °C)   Resp 18   Ht 5' 4\" (1.626 m)   Wt 68 kg (150 lb)   SpO2 99%   BMI 25.75 kg/m²        Physical Exam     Physical Exam  Vitals and nursing note reviewed.   Constitutional:       General: She is not in acute distress.     Appearance: Normal appearance. She is not ill-appearing.   HENT:      Head: Normocephalic.      Right Ear: External ear normal.      Left Ear: External ear normal.      Nose: Nose normal.   Eyes:      Pupils: Pupils are equal, round, and reactive to light. "   Cardiovascular:      Rate and Rhythm: Normal rate and regular rhythm.      Pulses: Normal pulses.      Heart sounds: Normal heart sounds.   Pulmonary:      Effort: Pulmonary effort is normal. No respiratory distress.      Breath sounds: Normal breath sounds. No stridor. No wheezing, rhonchi or rales.   Chest:      Chest wall: No tenderness.   Musculoskeletal:         General: Normal range of motion.      Cervical back: Normal range of motion and neck supple.   Lymphadenopathy:      Cervical: No cervical adenopathy.   Skin:     General: Skin is warm and dry.      Capillary Refill: Capillary refill takes less than 2 seconds.      Findings: Rash present. Rash is papular and urticarial.      Comments: Diffuse over BUE and neck   Neurological:      General: No focal deficit present.      Mental Status: She is alert and oriented to person, place, and time.   Psychiatric:         Mood and Affect: Mood normal.         Behavior: Behavior normal.

## 2025-04-01 ENCOUNTER — HOSPITAL ENCOUNTER (EMERGENCY)
Facility: HOSPITAL | Age: 40
Discharge: HOME | End: 2025-04-01
Attending: EMERGENCY MEDICINE
Payer: COMMERCIAL

## 2025-04-01 ENCOUNTER — OFFICE VISIT (OUTPATIENT)
Dept: URGENT CARE | Facility: CLINIC | Age: 40
End: 2025-04-01
Payer: COMMERCIAL

## 2025-04-01 ENCOUNTER — APPOINTMENT (EMERGENCY)
Dept: CT IMAGING | Facility: HOSPITAL | Age: 40
End: 2025-04-01
Payer: COMMERCIAL

## 2025-04-01 VITALS
BODY MASS INDEX: 27.31 KG/M2 | WEIGHT: 160 LBS | HEIGHT: 64 IN | RESPIRATION RATE: 16 BRPM | HEART RATE: 105 BPM | SYSTOLIC BLOOD PRESSURE: 116 MMHG | DIASTOLIC BLOOD PRESSURE: 62 MMHG | OXYGEN SATURATION: 98 % | TEMPERATURE: 97.2 F

## 2025-04-01 VITALS
HEART RATE: 55 BPM | TEMPERATURE: 97.8 F | SYSTOLIC BLOOD PRESSURE: 127 MMHG | OXYGEN SATURATION: 100 % | DIASTOLIC BLOOD PRESSURE: 89 MMHG | RESPIRATION RATE: 18 BRPM

## 2025-04-01 DIAGNOSIS — R10.9 ABDOMINAL PAIN: Primary | ICD-10-CM

## 2025-04-01 DIAGNOSIS — R10.9 RIGHT FLANK PAIN: Primary | ICD-10-CM

## 2025-04-01 LAB
ALBUMIN SERPL BCG-MCNC: 4.6 G/DL (ref 3.5–5)
ALP SERPL-CCNC: 63 U/L (ref 34–104)
ALT SERPL W P-5'-P-CCNC: 13 U/L (ref 7–52)
ANION GAP SERPL CALCULATED.3IONS-SCNC: 8 MMOL/L (ref 4–13)
AST SERPL W P-5'-P-CCNC: 17 U/L (ref 13–39)
BACTERIA UR QL AUTO: NORMAL /HPF
BASOPHILS # BLD AUTO: 0.04 THOUSANDS/ÂΜL (ref 0–0.1)
BASOPHILS NFR BLD AUTO: 1 % (ref 0–1)
BILIRUB DIRECT SERPL-MCNC: 0.08 MG/DL (ref 0–0.2)
BILIRUB SERPL-MCNC: 0.38 MG/DL (ref 0.2–1)
BILIRUB UR QL STRIP: NEGATIVE
BUN SERPL-MCNC: 16 MG/DL (ref 5–25)
CALCIUM SERPL-MCNC: 9.5 MG/DL (ref 8.4–10.2)
CHLORIDE SERPL-SCNC: 101 MMOL/L (ref 96–108)
CLARITY UR: CLEAR
CO2 SERPL-SCNC: 27 MMOL/L (ref 21–32)
COLOR UR: YELLOW
CREAT SERPL-MCNC: 0.73 MG/DL (ref 0.6–1.3)
EOSINOPHIL # BLD AUTO: 0.18 THOUSAND/ÂΜL (ref 0–0.61)
EOSINOPHIL NFR BLD AUTO: 2 % (ref 0–6)
ERYTHROCYTE [DISTWIDTH] IN BLOOD BY AUTOMATED COUNT: 12.3 % (ref 11.6–15.1)
EXT PREGNANCY TEST URINE: NEGATIVE
EXT. CONTROL: NORMAL
GFR SERPL CREATININE-BSD FRML MDRD: 105 ML/MIN/1.73SQ M
GLUCOSE SERPL-MCNC: 91 MG/DL (ref 65–140)
GLUCOSE UR STRIP-MCNC: NEGATIVE MG/DL
HCT VFR BLD AUTO: 41.2 % (ref 34.8–46.1)
HGB BLD-MCNC: 13.7 G/DL (ref 11.5–15.4)
HGB UR QL STRIP.AUTO: ABNORMAL
IMM GRANULOCYTES # BLD AUTO: 0.02 THOUSAND/UL (ref 0–0.2)
IMM GRANULOCYTES NFR BLD AUTO: 0 % (ref 0–2)
KETONES UR STRIP-MCNC: NEGATIVE MG/DL
LEUKOCYTE ESTERASE UR QL STRIP: NEGATIVE
LIPASE SERPL-CCNC: 29 U/L (ref 11–82)
LYMPHOCYTES # BLD AUTO: 2.59 THOUSANDS/ÂΜL (ref 0.6–4.47)
LYMPHOCYTES NFR BLD AUTO: 32 % (ref 14–44)
MCH RBC QN AUTO: 29.8 PG (ref 26.8–34.3)
MCHC RBC AUTO-ENTMCNC: 33.3 G/DL (ref 31.4–37.4)
MCV RBC AUTO: 90 FL (ref 82–98)
MONOCYTES # BLD AUTO: 0.68 THOUSAND/ÂΜL (ref 0.17–1.22)
MONOCYTES NFR BLD AUTO: 8 % (ref 4–12)
NEUTROPHILS # BLD AUTO: 4.56 THOUSANDS/ÂΜL (ref 1.85–7.62)
NEUTS SEG NFR BLD AUTO: 57 % (ref 43–75)
NITRITE UR QL STRIP: NEGATIVE
NON-SQ EPI CELLS URNS QL MICRO: NORMAL /HPF
NRBC BLD AUTO-RTO: 0 /100 WBCS
PH UR STRIP.AUTO: 6 [PH]
PLATELET # BLD AUTO: 324 THOUSANDS/UL (ref 149–390)
PMV BLD AUTO: 10.6 FL (ref 8.9–12.7)
POTASSIUM SERPL-SCNC: 3.7 MMOL/L (ref 3.5–5.3)
PROT SERPL-MCNC: 7.2 G/DL (ref 6.4–8.4)
PROT UR STRIP-MCNC: NEGATIVE MG/DL
RBC # BLD AUTO: 4.6 MILLION/UL (ref 3.81–5.12)
RBC #/AREA URNS AUTO: NORMAL /HPF
SODIUM SERPL-SCNC: 136 MMOL/L (ref 135–147)
SP GR UR STRIP.AUTO: 1.02 (ref 1–1.03)
UROBILINOGEN UR QL STRIP.AUTO: 0.2 E.U./DL
WBC # BLD AUTO: 8.07 THOUSAND/UL (ref 4.31–10.16)
WBC #/AREA URNS AUTO: NORMAL /HPF

## 2025-04-01 PROCEDURE — 81001 URINALYSIS AUTO W/SCOPE: CPT | Performed by: EMERGENCY MEDICINE

## 2025-04-01 PROCEDURE — 36415 COLL VENOUS BLD VENIPUNCTURE: CPT | Performed by: EMERGENCY MEDICINE

## 2025-04-01 PROCEDURE — 83690 ASSAY OF LIPASE: CPT | Performed by: EMERGENCY MEDICINE

## 2025-04-01 PROCEDURE — S9083 URGENT CARE CENTER GLOBAL: HCPCS

## 2025-04-01 PROCEDURE — G0382 LEV 3 HOSP TYPE B ED VISIT: HCPCS

## 2025-04-01 PROCEDURE — 96375 TX/PRO/DX INJ NEW DRUG ADDON: CPT

## 2025-04-01 PROCEDURE — 99284 EMERGENCY DEPT VISIT MOD MDM: CPT

## 2025-04-01 PROCEDURE — 82248 BILIRUBIN DIRECT: CPT | Performed by: EMERGENCY MEDICINE

## 2025-04-01 PROCEDURE — 80053 COMPREHEN METABOLIC PANEL: CPT | Performed by: EMERGENCY MEDICINE

## 2025-04-01 PROCEDURE — 99285 EMERGENCY DEPT VISIT HI MDM: CPT | Performed by: EMERGENCY MEDICINE

## 2025-04-01 PROCEDURE — 74177 CT ABD & PELVIS W/CONTRAST: CPT

## 2025-04-01 PROCEDURE — 96374 THER/PROPH/DIAG INJ IV PUSH: CPT

## 2025-04-01 PROCEDURE — 85025 COMPLETE CBC W/AUTO DIFF WBC: CPT | Performed by: EMERGENCY MEDICINE

## 2025-04-01 PROCEDURE — 96361 HYDRATE IV INFUSION ADD-ON: CPT

## 2025-04-01 PROCEDURE — 81025 URINE PREGNANCY TEST: CPT | Performed by: EMERGENCY MEDICINE

## 2025-04-01 RX ORDER — FAMOTIDINE 10 MG/ML
20 INJECTION, SOLUTION INTRAVENOUS ONCE
Status: COMPLETED | OUTPATIENT
Start: 2025-04-01 | End: 2025-04-01

## 2025-04-01 RX ORDER — KETOROLAC TROMETHAMINE 30 MG/ML
30 INJECTION, SOLUTION INTRAMUSCULAR; INTRAVENOUS ONCE
Status: COMPLETED | OUTPATIENT
Start: 2025-04-01 | End: 2025-04-01

## 2025-04-01 RX ADMIN — SODIUM CHLORIDE 1000 ML: 0.9 INJECTION, SOLUTION INTRAVENOUS at 19:26

## 2025-04-01 RX ADMIN — FAMOTIDINE 20 MG: 10 INJECTION, SOLUTION INTRAVENOUS at 19:25

## 2025-04-01 RX ADMIN — IOHEXOL 85 ML: 350 INJECTION, SOLUTION INTRAVENOUS at 20:03

## 2025-04-01 RX ADMIN — KETOROLAC TROMETHAMINE 30 MG: 30 INJECTION, SOLUTION INTRAMUSCULAR; INTRAVENOUS at 19:24

## 2025-04-01 NOTE — PATIENT INSTRUCTIONS
Recommended patient proceed to ED for further evaluation of abdominal pain. Discussed risks of delayed evaluation and intervention with serious etiology. Additionally discussed risks of personal transport vs transport via ambulance. Patient verbalized understanding.     Right upper abdominal pain and tenderness to palpation with positive right CVA tenderness increasing over past 3 days.  Patient also reporting diarrhea.      Follow up with PCP in 3-5 days.  Proceed to  ER if symptoms worsen.    If tests are performed, our office will contact you with results only if changes need to made to the care plan discussed with you at the visit. You can review your full results on [facility].

## 2025-04-01 NOTE — PROGRESS NOTES
[facility]        NAME: Melony Ramires is a 39 y.o. female  : 1985    MRN: [id number]  DATE: 2025  TIME: 5:04 PM    Assessment and Plan   Right flank pain [R10.9]  1. Right flank pain  Transfer to other facility        Positive right upper quadrant abdominal pain and tenderness to palpation with positive right CVA tenderness, unable to rule out cholecystitis, possible kidney stone or ovarian cyst.  Recommended patient proceed to higher level of care and ER, patient verbalized understanding and will have  transport her there now.      Patient Instructions     Recommended patient proceed to ED for further evaluation of abdominal pain. Discussed risks of delayed evaluation and intervention with serious etiology. Additionally discussed risks of personal transport vs transport via ambulance. Patient verbalized understanding.     Right upper abdominal pain and tenderness to palpation with positive right CVA tenderness increasing over past 3 days.  Patient also reporting diarrhea.      Follow up with PCP in 3-5 days.  Proceed to  ER if symptoms worsen.    If tests are performed, our office will contact you with results only if changes need to made to the care plan discussed with you at the visit. You can review your full results on [facility].    Chief Complaint     Chief Complaint   Patient presents with   • Back Pain     Pt c/o mid right back pain x 2 days , pt started with diarrhea and right side pain as of today         History of Present Illness       39-year-old female arrives reporting right mid back pain that radiates around right flank into right upper abdomen increasing over the past 3 days.  Patient denies any prior history of kidney stones.  Patient denies any prior history of ovarian cyst.  Patient reports she is currently on her menstrual cycle but has seen somewhat larger clots than she usually does.  Patient denies any trouble with urination including burning,  increased frequency or urgency.  Patient denies any vaginal discharge, reports vaginal bleeding but is currently on her menstrual cycle.  Patient denies any fevers or chills or sweats. Patient also reports that she started with diarrhea earlier today.  Patient is concerned that this could be her gallbladder or appendix.    Abdominal Pain  This is a new problem. The current episode started in the past 7 days. The problem occurs constantly. The problem has been gradually worsening. The pain is located in the RUQ and right flank. The pain is moderate. Associated symptoms include diarrhea. Pertinent negatives include no anorexia, arthralgias, belching, constipation, dysuria, fever, flatus, frequency, headaches, hematochezia, hematuria, melena, myalgias, nausea, vomiting or weight loss.       Review of Systems   Review of Systems   Constitutional:  Negative for fever and weight loss.   HENT: Negative.     Respiratory: Negative.     Gastrointestinal:  Positive for abdominal pain and diarrhea. Negative for anorexia, constipation, flatus, hematochezia, melena, nausea and vomiting.   Genitourinary:  Negative for dysuria, frequency and hematuria.   Musculoskeletal:  Negative for arthralgias and myalgias.   Neurological:  Negative for headaches.         Current Medications       Current Outpatient Medications:   •  cetirizine (ZyrTEC) 10 mg tablet, Take 10 mg by mouth daily, Disp: , Rfl:   •  Multiple Vitamins-Minerals (multivitamin with minerals) tablet, Take 1 tablet by mouth daily, Disp: , Rfl:   •  loratadine (CLARITIN) 10 mg tablet, Take 10 mg by mouth daily (Patient not taking: Reported on 4/1/2025), Disp: , Rfl:   •  mometasone (ELOCON) 0.1 % cream, Apply topically daily (Patient not taking: Reported on 4/1/2025), Disp: 15 g, Rfl: 0  •  pantoprazole (PROTONIX) 20 mg tablet, PLEASE SEE ATTACHED FOR DETAILED DIRECTIONS (Patient not taking: Reported on 4/1/2025), Disp: , Rfl:     Current Allergies     Allergies as of  "2025   • (No Known Allergies)            The following portions of the patient's history were reviewed and updated as appropriate: allergies, current medications, past family history, past medical history, past social history, past surgical history and problem list.     Past Medical History:   Diagnosis Date   • Allergic    • Liver cyst        Past Surgical History:   Procedure Laterality Date   • BREAST SURGERY     •  SECTION     • TUBAL LIGATION         Family History   Problem Relation Age of Onset   • Cancer Mother    • Ulcerative colitis Mother    • No Known Problems Father          Medications have been verified.        Objective   /62   Pulse 105   Temp (!) 97.2 °F (36.2 °C)   Resp 16   Ht 5' 4\" (1.626 m)   Wt 72.6 kg (160 lb)   LMP 2025 (Approximate)   SpO2 98%   BMI 27.46 kg/m²        Physical Exam     Physical Exam  Vitals and nursing note reviewed.   Constitutional:       General: She is not in acute distress.     Appearance: Normal appearance. She is not ill-appearing, toxic-appearing or diaphoretic.   HENT:      Head: Normocephalic.      Right Ear: Tympanic membrane, ear canal and external ear normal.      Left Ear: Tympanic membrane, ear canal and external ear normal.      Nose: Nose normal.      Mouth/Throat:      Mouth: Mucous membranes are moist.      Pharynx: No posterior oropharyngeal erythema.   Eyes:      Pupils: Pupils are equal, round, and reactive to light.   Cardiovascular:      Rate and Rhythm: Normal rate and regular rhythm.      Pulses: Normal pulses.      Heart sounds: Normal heart sounds.   Pulmonary:      Effort: Pulmonary effort is normal. No respiratory distress.      Breath sounds: Normal breath sounds. No stridor. No wheezing, rhonchi or rales.   Chest:      Chest wall: No tenderness.   Abdominal:      General: Abdomen is flat. Bowel sounds are normal. There is no distension.      Palpations: Abdomen is soft. There is no mass.      Tenderness: " There is abdominal tenderness in the right upper quadrant and epigastric area. There is right CVA tenderness. There is no left CVA tenderness, guarding or rebound.      Hernia: No hernia is present.   Musculoskeletal:         General: Normal range of motion.      Cervical back: Normal range of motion and neck supple.   Lymphadenopathy:      Cervical: No cervical adenopathy.   Skin:     General: Skin is warm and dry.      Capillary Refill: Capillary refill takes less than 2 seconds.   Neurological:      General: No focal deficit present.      Mental Status: She is alert and oriented to person, place, and time.   Psychiatric:         Mood and Affect: Mood normal.         Behavior: Behavior normal.

## 2025-04-01 NOTE — ED PROVIDER NOTES
Time reflects when diagnosis was documented in both MDM as applicable and the Disposition within this note       Time User Action Codes Description Comment    4/1/2025  9:35 PM Magdaleno Del Valle Add [R10.9] Abdominal pain           ED Disposition       ED Disposition   Discharge    Condition   Stable    Date/Time   Tue Apr 1, 2025  9:35 PM    Comment   Melony Ramires discharge to home/self care.                   Assessment & Plan       Medical Decision Making  Based on the history and medical screening exam performed the patient may be at risk for UTI, pyelonephritis, kidney stone, cholelithiasis, cholecystitis, pancreatitis, GERD, muscular pain, viral illness.    Based on the work-up performed in the emergency room which includes physical examination, and which may include laboratory studies and imaging as warranted including advanced imaging such as CT scan or ultrasound, the diagnostic considerations are narrowed to exclude limb or life-threatening process.    The patient is stable for discharge.  Lab work and urinalysis negative.  Discussed CT findings with patient.  She is aware of the nodular hyperplasia in her liver which she has already been advised this present previously.  Discussed finding of ovarian cyst and need for outpatient follow-up with GYN as needed.  Otherwise, patient remains hemodynamically stable, without need for further intervention at this time and appropriate for discharge    Amount and/or Complexity of Data Reviewed  Labs: ordered. Decision-making details documented in ED Course.     Details: Negative  Radiology: ordered and independent interpretation performed. Decision-making details documented in ED Course.     Details: CT abdomen pelvis without acute findings has incidental findings as noted above.  These have been discussed with the patient    Risk  Prescription drug management.             Medications   sodium chloride 0.9 % bolus 1,000 mL (1,000 mL Intravenous New Bag 4/1/25  )   ketorolac (TORADOL) injection 30 mg (30 mg Intravenous Given 25)   Famotidine (PF) (PEPCID) injection 20 mg (20 mg Intravenous Given 25)   iohexol (OMNIPAQUE) 350 MG/ML injection (MULTI-DOSE) 85 mL (85 mL Intravenous Given 25)       ED Risk Strat Scores                            SBIRT 20yo+      Flowsheet Row Most Recent Value   Initial Alcohol Screen: US AUDIT-C     1. How often do you have a drink containing alcohol? 0 Filed at: 2025   2. How many drinks containing alcohol do you have on a typical day you are drinking?  0 Filed at: 2025   3a. Male UNDER 65: How often do you have five or more drinks on one occasion? 0 Filed at: 2025   3b. FEMALE Any Age, or MALE 65+: How often do you have 4 or more drinks on one occassion? 0 Filed at: 2025   Audit-C Score 0 Filed at: 2025   EITAN: How many times in the past year have you...    Used an illegal drug or used a prescription medication for non-medical reasons? Never Filed at: 2025                            History of Present Illness       Chief Complaint   Patient presents with    Flank Pain     Pt reports right flank pain that radiates into her abdomen since ; denies any urinary complaints. Was seen at urgent care and referred here to get a CT       Past Medical History:   Diagnosis Date    Allergic     Liver cyst       Past Surgical History:   Procedure Laterality Date    BREAST SURGERY       SECTION      TUBAL LIGATION        Family History   Problem Relation Age of Onset    Cancer Mother     Ulcerative colitis Mother     No Known Problems Father       Social History     Tobacco Use    Smoking status: Never    Smokeless tobacco: Never   Vaping Use    Vaping status: Never Used   Substance Use Topics    Alcohol use: Yes     Comment: rare    Drug use: Not Currently      E-Cigarette/Vaping    E-Cigarette Use Never User       E-Cigarette/Vaping Substances     Nicotine No     THC No     CBD No     Flavoring No     Other No     Unknown No       I have reviewed and agree with the history as documented.     Began with right flank pain 3 days ago, now also with right upper quadrant abdominal pain.  No fevers or chills.  No nausea or vomiting.  No urinary symptoms.      History provided by:  Patient   used: No    Flank Pain  Pain location:  R flank  Pain quality: aching    Pain radiates to:  RUQ  Pain severity:  Moderate  Onset quality:  Gradual  Duration:  3 days  Timing:  Constant  Progression:  Unchanged  Chronicity:  New  Relieved by:  Nothing  Worsened by:  Nothing  Ineffective treatments:  None tried  Associated symptoms: no chest pain, no chills, no constipation, no cough, no diarrhea, no dysuria, no fever, no flatus, no hematemesis, no hematochezia, no hematuria, no nausea, no shortness of breath, no sore throat, no vaginal bleeding, no vaginal discharge and no vomiting        Review of Systems   Constitutional:  Negative for chills and fever.   HENT:  Negative for ear pain, hearing loss, sore throat, trouble swallowing and voice change.    Eyes:  Negative for pain and discharge.   Respiratory:  Negative for cough, shortness of breath and wheezing.    Cardiovascular:  Negative for chest pain and palpitations.   Gastrointestinal:  Negative for abdominal pain, blood in stool, constipation, diarrhea, flatus, hematemesis, hematochezia, nausea and vomiting.   Genitourinary:  Positive for flank pain. Negative for dysuria, frequency, hematuria, vaginal bleeding and vaginal discharge.   Musculoskeletal:  Negative for joint swelling, neck pain and neck stiffness.   Skin:  Negative for rash and wound.   Neurological:  Negative for dizziness, seizures, syncope, facial asymmetry and headaches.   Psychiatric/Behavioral:  Negative for hallucinations, self-injury and suicidal ideas.    All other systems reviewed and are negative.          Objective       ED Triage  Vitals [04/01/25 1758]   Temperature Pulse Blood Pressure Respirations SpO2 Patient Position - Orthostatic VS   97.8 °F (36.6 °C) 72 149/98 16 100 % Sitting      Temp Source Heart Rate Source BP Location FiO2 (%) Pain Score    Temporal Monitor Left arm -- 4      Vitals      Date and Time Temp Pulse SpO2 Resp BP Pain Score FACES Pain Rating User   04/01/25 2015 -- 55 100 % 18 127/89 -- -- [facility]   04/01/25 1954 -- -- -- -- -- 3 -- [facility]   04/01/25 1924 -- -- -- -- -- 10 - Worst Possible Pain -- [facility]   04/01/25 1758 97.8 °F (36.6 °C) 72 100 % 16 149/98 4 -- AS            Physical Exam  Vitals and nursing note reviewed.   Constitutional:       General: She is not in acute distress.     Appearance: She is well-developed.   HENT:      Head: Normocephalic and atraumatic.      Right Ear: External ear normal.      Left Ear: External ear normal.   Eyes:      General: No scleral icterus.        Right eye: No discharge.         Left eye: No discharge.      Extraocular Movements: Extraocular movements intact.      Conjunctiva/sclera: Conjunctivae normal.   Cardiovascular:      Rate and Rhythm: Normal rate and regular rhythm.      Heart sounds: Normal heart sounds. No murmur heard.  Pulmonary:      Effort: Pulmonary effort is normal.      Breath sounds: Normal breath sounds. No wheezing or rales.   Abdominal:      General: Bowel sounds are normal. There is no distension.      Palpations: Abdomen is soft.      Tenderness: There is no abdominal tenderness. There is no right CVA tenderness, left CVA tenderness, guarding or rebound.   Musculoskeletal:         General: No deformity. Normal range of motion.      Cervical back: Normal range of motion and neck supple.   Skin:     General: Skin is warm and dry.      Findings: No rash.   Neurological:      General: No focal deficit present.      Mental Status: She is alert and oriented to person, place, and time.      Cranial Nerves: No cranial nerve deficit.   Psychiatric:         Mood and  Affect: Mood normal.         Behavior: Behavior normal.         Thought Content: Thought content normal.         Judgment: Judgment normal.         Results Reviewed       Procedure Component Value Units Date/Time    Urine Microscopic [[id number]]  (Normal) Collected: 04/01/25 1927    Lab Status: Final result Specimen: Urine, Clean Catch Updated: 04/01/25 2011     RBC, UA 2-4 /hpf      WBC, UA 0-1 /hpf      Epithelial Cells Occasional /hpf      Bacteria, UA Occasional /hpf     Comprehensive metabolic panel [[id number]] Collected: 04/01/25 1923    Lab Status: Final result Specimen: Blood from Arm, Right Updated: 04/01/25 1953     Sodium 136 mmol/L      Potassium 3.7 mmol/L      Chloride 101 mmol/L      CO2 27 mmol/L      ANION GAP 8 mmol/L      BUN 16 mg/dL      Creatinine 0.73 mg/dL      Glucose 91 mg/dL      Calcium 9.5 mg/dL      AST 17 U/L      ALT 13 U/L      Alkaline Phosphatase 63 U/L      Total Protein 7.2 g/dL      Albumin 4.6 g/dL      Total Bilirubin 0.38 mg/dL      eGFR 105 ml/min/1.73sq m     Narrative:      National Kid[employer] guidelines for Chronic Kidney Disease (CKD):     Stage 1 with normal or high GFR (GFR > 90 mL/min/1.73 square meters)    Stage 2 Mild CKD (GFR = 60-89 mL/min/1.73 square meters)    Stage 3A Moderate CKD (GFR = 45-59 mL/min/1.73 square meters)    Stage 3B Moderate CKD (GFR = 30-44 mL/min/1.73 square meters)    Stage 4 Severe CKD (GFR = 15-29 mL/min/1.73 square meters)    Stage 5 End Stage CKD (GFR <15 mL/min/1.73 square meters)  Note: GFR calculation is accurate only with a steady state creatinine    Bilirubin, direct [[id number]]  (Normal) Collected: 04/01/25 1923    Lab Status: Final result Specimen: Blood from Arm, Right Updated: 04/01/25 1953     Bilirubin, Direct 0.08 mg/dL     Lipase [[id number]]  (Normal) Collected: 04/01/25 1923    Lab Status: Final result Specimen: Blood from Arm, Right Updated: 04/01/25 1953     Lipase 29 u/L     UA w Reflex to Microscopic w  Reflex to Culture [[id number]]  (Abnormal) Collected: 04/01/25 1927    Lab Status: Final result Specimen: Urine, Clean Catch Updated: 04/01/25 1945     Color, UA Yellow     Clarity, UA Clear     Specific Gravity, UA 1.020     pH, UA 6.0     Leukocytes, UA Negative     Nitrite, UA Negative     Protein, UA Negative mg/dl      Glucose, UA Negative mg/dl      Ketones, UA Negative mg/dl      Urobilinogen, UA 0.2 E.U./dl      Bilirubin, UA Negative     Occult Blood, UA Small    CBC and differential [[id number]] Collected: 04/01/25 1923    Lab Status: Final result Specimen: Blood from Arm, Right Updated: 04/01/25 1938     WBC 8.07 Thousand/uL      RBC 4.60 Million/uL      Hemoglobin 13.7 g/dL      Hematocrit 41.2 %      MCV 90 fL      MCH 29.8 pg      MCHC 33.3 g/dL      RDW 12.3 %      MPV 10.6 fL      Platelets 324 Thousands/uL      nRBC 0 /100 WBCs      Segmented % 57 %      Immature Grans % 0 %      Lymphocytes % 32 %      Monocytes % 8 %      Eosinophils Relative 2 %      Basophils Relative 1 %      Absolute Neutrophils 4.56 Thousands/µL      Absolute Immature Grans 0.02 Thousand/uL      Absolute Lymphocytes 2.59 Thousands/µL      Absolute Monocytes 0.68 Thousand/µL      Eosinophils Absolute 0.18 Thousand/µL      Basophils Absolute 0.04 Thousands/µL     POCT pregnancy, urine [[id number]]  (Normal) Collected: 04/01/25 1932    Lab Status: Final result Updated: 04/01/25 1932     EXT Preg Test, Ur Negative     Control Valid            CT abdomen pelvis w contrast   Final Interpretation by Darrell Aranda MD (04/01 2124)      Right hepatic lobe lesion measuring up to 4.6 cm with imaging characteristics most compatible with focal nodular hyperplasia (however true triple phase liver protocol was not performed). Further evaluation with nonemergent contrast-enhanced MRI of the    abdomen is recommended.      Left ovarian 3.6 cm cyst. Further evaluation with pelvic ultrasound may be performed as clinically warranted.       Examination was marked in EPIC for immediate notification         Workstation performed: [id number]             Procedures    ED Medication and Procedure Management   Prior to Admission Medications   Prescriptions Last Dose Informant Patient Reported? Taking?   Multiple Vitamins-Minerals (multivitamin with minerals) tablet   Yes No   Sig: Take 1 tablet by mouth daily   cetirizine (ZyrTEC) 10 mg tablet   Yes No   Sig: Take 10 mg by mouth daily   loratadine (CLARITIN) 10 mg tablet   Yes No   Sig: Take 10 mg by mouth daily   Patient not taking: Reported on 4/1/2025   mometasone (ELOCON) 0.1 % cream   No No   Sig: Apply topically daily   Patient not taking: Reported on 4/1/2025   pantoprazole (PROTONIX) 20 mg tablet   Yes No   Sig: PLEASE SEE ATTACHED FOR DETAILED DIRECTIONS   Patient not taking: Reported on 4/1/2025      Facility-Administered Medications: None     Patient's Medications   Discharge Prescriptions    No medications on file     No discharge procedures on file.  ED SEPSIS DOCUMENTATION   Time reflects when diagnosis was documented in both MDM as applicable and the Disposition within this note       Time User Action Codes Description Comment    4/1/2025  9:35 PM Magdaleno Del Valle Add [R10.9] Abdominal pain                  Magdaleno Del Valle MD  04/01/25 2138

## 2025-04-02 NOTE — DISCHARGE INSTRUCTIONS
The workup performed in the emergency department today was negative.  There were incidental findings on the CT of the abdomen and pelvis which include:    Nodular hyperplasia of the liver.  Please schedule follow-up with your primary care physician    Left-sided ovarian cyst.  Please schedule follow-up with your GYN as needed.

## 2025-04-04 DIAGNOSIS — K76.9 LIVER DISEASE, UNSPECIFIED: ICD-10-CM

## 2025-04-04 DIAGNOSIS — N83.202 UNSPECIFIED OVARIAN CYST, LEFT SIDE: ICD-10-CM

## 2025-04-14 ENCOUNTER — HOSPITAL ENCOUNTER (OUTPATIENT)
Dept: MRI IMAGING | Facility: HOSPITAL | Age: 40
Discharge: HOME | End: 2025-04-14
Payer: COMMERCIAL

## 2025-04-14 DIAGNOSIS — K76.9 LIVER DISEASE, UNSPECIFIED: ICD-10-CM

## 2025-04-14 PROCEDURE — 74183 MRI ABD W/O CNTR FLWD CNTR: CPT

## 2025-04-14 PROCEDURE — A9585 GADOBUTROL INJECTION: HCPCS | Performed by: RADIOLOGY

## 2025-04-14 RX ORDER — GADOBUTROL 604.72 MG/ML
7 INJECTION INTRAVENOUS
Status: COMPLETED | OUTPATIENT
Start: 2025-04-14 | End: 2025-04-14

## 2025-04-14 RX ADMIN — GADOBUTROL 7 ML: 604.72 INJECTION INTRAVENOUS at 16:59

## 2025-05-07 ENCOUNTER — HOSPITAL ENCOUNTER (OUTPATIENT)
Dept: ULTRASOUND IMAGING | Facility: HOSPITAL | Age: 40
Discharge: HOME | End: 2025-05-07
Attending: NURSE PRACTITIONER
Payer: COMMERCIAL

## 2025-05-07 DIAGNOSIS — N83.202 UNSPECIFIED OVARIAN CYST, LEFT SIDE: ICD-10-CM

## 2025-05-07 PROCEDURE — 76830 TRANSVAGINAL US NON-OB: CPT

## 2025-05-07 PROCEDURE — 76856 US EXAM PELVIC COMPLETE: CPT

## 2025-05-30 ENCOUNTER — OFFICE VISIT (OUTPATIENT)
Dept: URGENT CARE | Facility: CLINIC | Age: 40
End: 2025-05-30
Payer: COMMERCIAL

## 2025-05-30 VITALS
SYSTOLIC BLOOD PRESSURE: 133 MMHG | BODY MASS INDEX: 26.87 KG/M2 | HEART RATE: 64 BPM | HEIGHT: 64 IN | OXYGEN SATURATION: 99 % | DIASTOLIC BLOOD PRESSURE: 92 MMHG | TEMPERATURE: 97.2 F | WEIGHT: 157.4 LBS | RESPIRATION RATE: 18 BRPM

## 2025-05-30 DIAGNOSIS — H66.001 NON-RECURRENT ACUTE SUPPURATIVE OTITIS MEDIA OF RIGHT EAR WITHOUT SPONTANEOUS RUPTURE OF TYMPANIC MEMBRANE: Primary | ICD-10-CM

## 2025-05-30 PROCEDURE — G0382 LEV 3 HOSP TYPE B ED VISIT: HCPCS

## 2025-05-30 PROCEDURE — S9083 URGENT CARE CENTER GLOBAL: HCPCS

## 2025-05-30 RX ORDER — AMOXICILLIN 875 MG/1
875 TABLET, COATED ORAL 2 TIMES DAILY
Qty: 14 TABLET | Refills: 0 | Status: SHIPPED | OUTPATIENT
Start: 2025-05-30 | End: 2025-06-06

## 2025-05-30 NOTE — PATIENT INSTRUCTIONS
Note that ear discomfort from fluid may persist for up to one month  Fluids and rest  Tylenol/Ibuprofen for discomfort     Over the counter decongestants as needed

## 2025-05-30 NOTE — PROGRESS NOTES
[facility]  Name: Melony Ramires      : 1985      MRN: [id number]  Encounter Provider: Trino Moore PA-C  Encounter Date: 2025   Encounter department: [facility] [place]  :  Assessment & Plan  Non-recurrent acute suppurative otitis media of right ear without spontaneous rupture of tympanic membrane    Orders:    amoxicillin (AMOXIL) 875 mg tablet; Take 1 tablet (875 mg total) by mouth 2 (two) times a day for 7 days        Patient Instructions  Follow up with PCP in 3-5 days.  Proceed to  ER if symptoms worsen.    If tests are performed, our office will contact you with results only if changes need to made to the care plan discussed with you at the visit. You can review your full results on [facility] MyChart.    Chief Complaint:   Chief Complaint   Patient presents with    Earache     Right ear pain x 3 days      History of Present Illness   39-year-old female presenting with right ear pain x 2 days.  Denies any fevers, chills, body aches, numbness, tingling, congestion, cough, runny nose or sore throat.  Denies any discharge or sudden onset of pain.  Just noticed it gradually coming on when she woke up in the morning 3 days ago.  Denies any foreign body instrumentation.  States that she was scratching or picking on the area though however 3 days ago.    Earache   Pertinent negatives include no coughing, diarrhea, ear discharge, headaches, rhinorrhea, sore throat or vomiting.         Review of Systems   Constitutional:  Negative for chills, diaphoresis, fatigue and fever.   HENT:  Positive for ear pain. Negative for congestion, ear discharge, postnasal drip, rhinorrhea, sinus pressure and sore throat.    Respiratory:  Negative for cough and choking.    Cardiovascular:  Negative for chest pain and palpitations.   Gastrointestinal:  Negative for diarrhea, nausea and vomiting.   Musculoskeletal:  Negative for arthralgias and myalgias.   Skin:  Negative for color change.  "  Neurological:  Negative for dizziness, light-headedness and headaches.     Past Medical History   Past Medical History[1]  Past Surgical History[2]  Family History[3]  she reports that she has never smoked. She has never used smokeless tobacco. She reports current alcohol use. She reports that she does not currently use drugs.  Current Outpatient Medications   Medication Instructions    amoxicillin (AMOXIL) 875 mg, Oral, 2 times daily    cetirizine (ZYRTEC) 10 mg, Daily    loratadine (CLARITIN) 10 mg, Daily    mometasone (ELOCON) 0.1 % cream Topical, Daily    Multiple Vitamins-Minerals (multivitamin with minerals) tablet 1 tablet, Daily    pantoprazole (PROTONIX) 20 mg tablet PLEASE SEE ATTACHED FOR DETAILED DIRECTIONS   Allergies[4]     Objective   /92   Pulse 64   Temp (!) 97.2 °F (36.2 °C) (Tympanic)   Resp 18   Ht 5' 4\" (1.626 m)   Wt 71.4 kg (157 lb 6.4 oz)   SpO2 99%   BMI 27.02 kg/m²      Physical Exam  Vitals and nursing note reviewed.   Constitutional:       General: She is not in acute distress.     Appearance: She is normal weight.   HENT:      Head: Normocephalic and atraumatic.      Right Ear: Ear canal and external ear normal.      Left Ear: Tympanic membrane, ear canal and external ear normal.      Ears:      Comments: Erythematous bulging right TM.     Nose: Nose normal. No congestion.      Mouth/Throat:      Mouth: Mucous membranes are moist.      Pharynx: Oropharynx is clear. No oropharyngeal exudate.     Eyes:      General:         Right eye: No discharge.         Left eye: No discharge.      Conjunctiva/sclera: Conjunctivae normal.      Pupils: Pupils are equal, round, and reactive to light.       Cardiovascular:      Rate and Rhythm: Normal rate and regular rhythm.      Pulses: Normal pulses.      Heart sounds: Normal heart sounds.   Pulmonary:      Effort: Pulmonary effort is normal. No respiratory distress.      Breath sounds: Normal breath sounds. No wheezing.   Abdominal:      " "General: Abdomen is flat. Bowel sounds are normal.      Tenderness: There is no abdominal tenderness.   Lymphadenopathy:      Cervical: No cervical adenopathy.     Skin:     General: Skin is warm.      Capillary Refill: Capillary refill takes less than 2 seconds.     Neurological:      General: No focal deficit present.      Mental Status: She is alert and oriented to person, place, and time.     Psychiatric:         Mood and Affect: Mood normal.         Behavior: Behavior normal.         Portions of the record may have been created with voice recognition software.  Occasional wrong word or \"sound a like\" substitutions may have occurred due to the inherent limitations of voice recognition software.  Read the chart carefully and recognize, using context, where substitutions have occurred.       [1]   Past Medical History:  Diagnosis Date    Allergic     Liver cyst    [2]   Past Surgical History:  Procedure Laterality Date    BREAST SURGERY       SECTION      TUBAL LIGATION     [3]   Family History  Problem Relation Name Age of Onset    Cancer Mother      Ulcerative colitis Mother      No Known Problems Father     [4] No Known Allergies    "

## 2025-08-01 ENCOUNTER — HOSPITAL ENCOUNTER (OUTPATIENT)
Dept: MRI IMAGING | Facility: HOSPITAL | Age: 40
Discharge: HOME | End: 2025-08-01
Attending: FAMILY MEDICINE
Payer: COMMERCIAL

## 2025-08-01 DIAGNOSIS — K76.89 OTHER SPECIFIED DISEASES OF LIVER: ICD-10-CM

## 2025-08-01 PROCEDURE — A9581 GADOXETATE DISODIUM INJ: HCPCS | Performed by: RADIOLOGY

## 2025-08-01 PROCEDURE — 74183 MRI ABD W/O CNTR FLWD CNTR: CPT

## 2025-08-01 RX ADMIN — GADOXETATE DISODIUM 7 ML: 181.43 INJECTION, SOLUTION INTRAVENOUS at 10:23
